# Patient Record
Sex: MALE | Race: BLACK OR AFRICAN AMERICAN | NOT HISPANIC OR LATINO | Employment: FULL TIME | URBAN - METROPOLITAN AREA
[De-identification: names, ages, dates, MRNs, and addresses within clinical notes are randomized per-mention and may not be internally consistent; named-entity substitution may affect disease eponyms.]

---

## 2023-03-24 LAB
ALANINE AMINOTRANSFERASE (SGPT) (U/L) IN SER/PLAS: 50 U/L (ref 10–52)
ALBUMIN (G/DL) IN SER/PLAS: 4.4 G/DL (ref 3.4–5)
ALKALINE PHOSPHATASE (U/L) IN SER/PLAS: 81 U/L (ref 33–120)
ANION GAP IN SER/PLAS: 15 MMOL/L (ref 10–20)
ASPARTATE AMINOTRANSFERASE (SGOT) (U/L) IN SER/PLAS: 29 U/L (ref 9–39)
BILIRUBIN TOTAL (MG/DL) IN SER/PLAS: 0.6 MG/DL (ref 0–1.2)
CALCIDIOL (25 OH VITAMIN D3) (NG/ML) IN SER/PLAS: 62 NG/ML
CALCIUM (MG/DL) IN SER/PLAS: 9.8 MG/DL (ref 8.6–10.6)
CARBON DIOXIDE, TOTAL (MMOL/L) IN SER/PLAS: 29 MMOL/L (ref 21–32)
CHLORIDE (MMOL/L) IN SER/PLAS: 95 MMOL/L (ref 98–107)
CHOLESTEROL (MG/DL) IN SER/PLAS: 155 MG/DL (ref 0–199)
CHOLESTEROL IN HDL (MG/DL) IN SER/PLAS: 62 MG/DL
CHOLESTEROL/HDL RATIO: 2.5
CREATININE (MG/DL) IN SER/PLAS: 0.85 MG/DL (ref 0.5–1.3)
ERYTHROCYTE DISTRIBUTION WIDTH (RATIO) BY AUTOMATED COUNT: 12.3 % (ref 11.5–14.5)
ERYTHROCYTE MEAN CORPUSCULAR HEMOGLOBIN CONCENTRATION (G/DL) BY AUTOMATED: 34.9 G/DL (ref 32–36)
ERYTHROCYTE MEAN CORPUSCULAR VOLUME (FL) BY AUTOMATED COUNT: 91 FL (ref 80–100)
ERYTHROCYTES (10*6/UL) IN BLOOD BY AUTOMATED COUNT: 4.71 X10E12/L (ref 4.5–5.9)
ESTIMATED AVERAGE GLUCOSE FOR HBA1C: 134 MG/DL
GFR MALE: >90 ML/MIN/1.73M2
GLUCOSE (MG/DL) IN SER/PLAS: 147 MG/DL (ref 74–99)
HEMATOCRIT (%) IN BLOOD BY AUTOMATED COUNT: 42.7 % (ref 41–52)
HEMOGLOBIN (G/DL) IN BLOOD: 14.9 G/DL (ref 13.5–17.5)
HEMOGLOBIN A1C/HEMOGLOBIN TOTAL IN BLOOD: 6.3 %
LDL: 60 MG/DL (ref 0–99)
LEUKOCYTES (10*3/UL) IN BLOOD BY AUTOMATED COUNT: 8 X10E9/L (ref 4.4–11.3)
NRBC (PER 100 WBCS) BY AUTOMATED COUNT: 0 /100 WBC (ref 0–0)
PLATELETS (10*3/UL) IN BLOOD AUTOMATED COUNT: 331 X10E9/L (ref 150–450)
POTASSIUM (MMOL/L) IN SER/PLAS: 3.7 MMOL/L (ref 3.5–5.3)
PROTEIN TOTAL: 7.5 G/DL (ref 6.4–8.2)
SODIUM (MMOL/L) IN SER/PLAS: 135 MMOL/L (ref 136–145)
THYROTROPIN (MIU/L) IN SER/PLAS BY DETECTION LIMIT <= 0.05 MIU/L: 1.17 MIU/L (ref 0.44–3.98)
TRIGLYCERIDE (MG/DL) IN SER/PLAS: 163 MG/DL (ref 0–149)
UREA NITROGEN (MG/DL) IN SER/PLAS: 15 MG/DL (ref 6–23)
VLDL: 33 MG/DL (ref 0–40)

## 2023-06-30 LAB
ESTIMATED AVERAGE GLUCOSE FOR HBA1C: 148 MG/DL
HEMOGLOBIN A1C/HEMOGLOBIN TOTAL IN BLOOD: 6.8 %

## 2023-09-05 PROBLEM — E78.00 HIGH CHOLESTEROL: Status: ACTIVE | Noted: 2023-09-05

## 2023-09-05 PROBLEM — F11.20 OPIOID DEPENDENCE, CONTINUOUS (MULTI): Status: ACTIVE | Noted: 2023-09-05

## 2023-09-05 PROBLEM — N52.9 ERECTILE DYSFUNCTION: Status: ACTIVE | Noted: 2023-09-05

## 2023-09-05 PROBLEM — N13.8 BPH WITH OBSTRUCTION/LOWER URINARY TRACT SYMPTOMS: Status: ACTIVE | Noted: 2023-09-05

## 2023-09-05 PROBLEM — F41.9 ANXIETY DISORDER: Status: ACTIVE | Noted: 2023-09-05

## 2023-09-05 PROBLEM — K85.90 PANCREATITIS (HHS-HCC): Status: ACTIVE | Noted: 2023-09-05

## 2023-09-05 PROBLEM — I10 HYPERTENSION: Status: ACTIVE | Noted: 2023-09-05

## 2023-09-05 PROBLEM — E11.9 DIABETES (MULTI): Status: ACTIVE | Noted: 2023-09-05

## 2023-09-05 PROBLEM — R73.9 HYPERGLYCEMIA: Status: ACTIVE | Noted: 2023-09-05

## 2023-09-05 PROBLEM — Z79.899 LONG-TERM CURRENT USE OF BENZODIAZEPINE: Status: ACTIVE | Noted: 2023-09-05

## 2023-09-05 PROBLEM — M87.051 AVASCULAR NECROSIS OF BONE OF RIGHT HIP (MULTI): Status: ACTIVE | Noted: 2023-09-05

## 2023-09-05 PROBLEM — R00.0 TACHYCARDIA: Status: ACTIVE | Noted: 2023-09-05

## 2023-09-05 PROBLEM — E87.1 HYPONATREMIA: Status: ACTIVE | Noted: 2023-09-05

## 2023-09-05 PROBLEM — R35.1 NOCTURIA: Status: ACTIVE | Noted: 2023-09-05

## 2023-09-05 PROBLEM — N40.1 BPH WITH OBSTRUCTION/LOWER URINARY TRACT SYMPTOMS: Status: ACTIVE | Noted: 2023-09-05

## 2023-09-05 PROBLEM — R79.89 LOW VITAMIN D LEVEL: Status: ACTIVE | Noted: 2023-09-05

## 2023-09-05 RX ORDER — TADALAFIL 5 MG/1
1 TABLET ORAL DAILY
COMMUNITY
Start: 2017-11-08 | End: 2023-12-06

## 2023-09-05 RX ORDER — LISINOPRIL 40 MG/1
1 TABLET ORAL DAILY
COMMUNITY
Start: 2014-12-27 | End: 2023-12-21 | Stop reason: SDUPTHER

## 2023-09-05 RX ORDER — NALOXONE HYDROCHLORIDE 4 MG/.1ML
SPRAY NASAL
COMMUNITY
Start: 2021-12-16

## 2023-09-05 RX ORDER — CHLORTHALIDONE 25 MG/1
1 TABLET ORAL DAILY
COMMUNITY
Start: 2015-10-07 | End: 2024-05-07

## 2023-09-05 RX ORDER — ALPRAZOLAM 0.5 MG/1
1 TABLET ORAL 2 TIMES DAILY
COMMUNITY
Start: 2013-11-22 | End: 2023-10-11 | Stop reason: SDUPTHER

## 2023-09-05 RX ORDER — ASPIRIN 81 MG/1
1 TABLET ORAL DAILY
COMMUNITY
Start: 2018-01-22

## 2023-09-05 RX ORDER — INDOMETHACIN 50 MG/1
1 CAPSULE ORAL 2 TIMES DAILY
COMMUNITY
Start: 2020-12-10

## 2023-09-05 RX ORDER — IBUPROFEN 800 MG/1
1 TABLET ORAL 3 TIMES DAILY PRN
COMMUNITY
Start: 2018-11-02

## 2023-09-05 RX ORDER — TRAMADOL HYDROCHLORIDE 50 MG/1
1 TABLET ORAL 2 TIMES DAILY PRN
COMMUNITY
Start: 2019-01-25 | End: 2024-03-21

## 2023-09-05 RX ORDER — HYDROCHLOROTHIAZIDE 25 MG/1
1 TABLET ORAL DAILY
COMMUNITY

## 2023-09-05 RX ORDER — METFORMIN HYDROCHLORIDE 500 MG/1
500 TABLET ORAL DAILY
COMMUNITY
End: 2024-01-08 | Stop reason: SDUPTHER

## 2023-09-05 RX ORDER — CHOLECALCIFEROL (VITAMIN D3) 25 MCG
1 TABLET ORAL DAILY
COMMUNITY
Start: 2021-05-19 | End: 2023-12-21 | Stop reason: SDUPTHER

## 2023-09-05 RX ORDER — CYCLOBENZAPRINE HCL 10 MG
1 TABLET ORAL NIGHTLY PRN
COMMUNITY
Start: 2021-02-19 | End: 2023-10-09

## 2023-09-05 RX ORDER — ATORVASTATIN CALCIUM 40 MG/1
1 TABLET, FILM COATED ORAL DAILY
COMMUNITY
Start: 2022-03-03 | End: 2024-02-19

## 2023-09-05 RX ORDER — OMEPRAZOLE 40 MG/1
40 CAPSULE, DELAYED RELEASE ORAL DAILY
COMMUNITY
Start: 2018-08-01 | End: 2024-01-29

## 2023-10-06 DIAGNOSIS — M87.051 AVASCULAR NECROSIS OF BONE OF RIGHT HIP (MULTI): Primary | ICD-10-CM

## 2023-10-09 RX ORDER — CYCLOBENZAPRINE HCL 10 MG
10 TABLET ORAL NIGHTLY PRN
Qty: 30 TABLET | Refills: 1 | Status: SHIPPED | OUTPATIENT
Start: 2023-10-09 | End: 2023-12-06 | Stop reason: SDUPTHER

## 2023-10-10 DIAGNOSIS — M87.051 AVASCULAR NECROSIS OF BONE OF RIGHT HIP (MULTI): ICD-10-CM

## 2023-10-11 ENCOUNTER — OFFICE VISIT (OUTPATIENT)
Dept: PRIMARY CARE | Facility: CLINIC | Age: 53
End: 2023-10-11
Payer: COMMERCIAL

## 2023-10-11 VITALS
WEIGHT: 168.8 LBS | RESPIRATION RATE: 16 BRPM | HEIGHT: 70 IN | DIASTOLIC BLOOD PRESSURE: 78 MMHG | TEMPERATURE: 96 F | OXYGEN SATURATION: 96 % | SYSTOLIC BLOOD PRESSURE: 123 MMHG | HEART RATE: 96 BPM | BODY MASS INDEX: 24.17 KG/M2

## 2023-10-11 DIAGNOSIS — I10 PRIMARY HYPERTENSION: ICD-10-CM

## 2023-10-11 DIAGNOSIS — E11.9 TYPE 2 DIABETES MELLITUS WITHOUT COMPLICATION, WITHOUT LONG-TERM CURRENT USE OF INSULIN (MULTI): Primary | ICD-10-CM

## 2023-10-11 DIAGNOSIS — F41.9 ANXIETY DISORDER, UNSPECIFIED TYPE: ICD-10-CM

## 2023-10-11 LAB
EST. AVERAGE GLUCOSE BLD GHB EST-MCNC: 128 MG/DL
HBA1C MFR BLD: 6.1 %

## 2023-10-11 PROCEDURE — 36415 COLL VENOUS BLD VENIPUNCTURE: CPT | Performed by: INTERNAL MEDICINE

## 2023-10-11 PROCEDURE — 3044F HG A1C LEVEL LT 7.0%: CPT | Performed by: INTERNAL MEDICINE

## 2023-10-11 PROCEDURE — 83036 HEMOGLOBIN GLYCOSYLATED A1C: CPT | Performed by: INTERNAL MEDICINE

## 2023-10-11 PROCEDURE — 4010F ACE/ARB THERAPY RXD/TAKEN: CPT | Performed by: INTERNAL MEDICINE

## 2023-10-11 PROCEDURE — 3074F SYST BP LT 130 MM HG: CPT | Performed by: INTERNAL MEDICINE

## 2023-10-11 PROCEDURE — 99213 OFFICE O/P EST LOW 20 MIN: CPT | Performed by: INTERNAL MEDICINE

## 2023-10-11 PROCEDURE — 3078F DIAST BP <80 MM HG: CPT | Performed by: INTERNAL MEDICINE

## 2023-10-11 RX ORDER — ALPRAZOLAM 0.5 MG/1
0.5 TABLET ORAL 2 TIMES DAILY
Qty: 60 TABLET | Refills: 2 | Status: SHIPPED | OUTPATIENT
Start: 2023-10-11 | End: 2024-01-12 | Stop reason: SDUPTHER

## 2023-10-11 RX ORDER — CYCLOBENZAPRINE HCL 10 MG
10 TABLET ORAL NIGHTLY PRN
Qty: 30 TABLET | Refills: 1 | OUTPATIENT
Start: 2023-10-11

## 2023-10-11 ASSESSMENT — ANXIETY QUESTIONNAIRES
7. FEELING AFRAID AS IF SOMETHING AWFUL MIGHT HAPPEN: NOT AT ALL
3. WORRYING TOO MUCH ABOUT DIFFERENT THINGS: NOT AT ALL
4. TROUBLE RELAXING: NOT AT ALL
6. BECOMING EASILY ANNOYED OR IRRITABLE: NOT AT ALL
IF YOU CHECKED OFF ANY PROBLEMS ON THIS QUESTIONNAIRE, HOW DIFFICULT HAVE THESE PROBLEMS MADE IT FOR YOU TO DO YOUR WORK, TAKE CARE OF THINGS AT HOME, OR GET ALONG WITH OTHER PEOPLE: NOT DIFFICULT AT ALL
1. FEELING NERVOUS, ANXIOUS, OR ON EDGE: NOT AT ALL
5. BEING SO RESTLESS THAT IT IS HARD TO SIT STILL: NOT AT ALL
2. NOT BEING ABLE TO STOP OR CONTROL WORRYING: NOT AT ALL
GAD7 TOTAL SCORE: 0

## 2023-10-11 ASSESSMENT — ENCOUNTER SYMPTOMS
ABDOMINAL PAIN: 0
FEVER: 0
VOMITING: 0
SHORTNESS OF BREATH: 0
CHILLS: 0
NAUSEA: 0

## 2023-10-11 ASSESSMENT — PAIN SCALES - GENERAL: PAINLEVEL: 0-NO PAIN

## 2023-10-11 ASSESSMENT — LIFESTYLE VARIABLES: TOTAL SCORE: 0

## 2023-10-11 NOTE — PROGRESS NOTES
"Subjective   Patient ID: Aakash Chambers is a 53 y.o. male who presents for Hyperlipidemia.    Patient presents for follow up. Anxiety and Pain meds both working although he is having some pain in his left hip for which he sees Ortho tomorrow for.  Has a history of surgery on that hip.     Hyperlipidemia  Pertinent negatives include no chest pain or shortness of breath.        Review of Systems   Constitutional:  Negative for chills and fever.   Respiratory:  Negative for shortness of breath.    Cardiovascular:  Negative for chest pain.   Gastrointestinal:  Negative for abdominal pain, nausea and vomiting.       Objective   /78 (BP Location: Left arm, Patient Position: Sitting, BP Cuff Size: Adult)   Pulse 96   Temp 35.6 °C (96 °F) (Temporal)   Resp 16   Ht 1.778 m (5' 10\")   Wt 76.6 kg (168 lb 12.8 oz)   SpO2 96%   BMI 24.22 kg/m²     Physical Exam  Gen appearance: well groomed in NAD  A & O: alert and oriented x 3  CV: RRR   Lungs: CTA bilaterally  Extr: no edema    Assessment/Plan   Anxiety: controlled on current med. States anxiety is controlled on a daily basis. I see the benefit of continuing this med at its current dose. It allows him to perform all activities of daily living.  Diabetes: will check labs today  High blood pressure: continue meds.   4.    Hip pain: will follow up with Ortho this week.  5.    Return to office in 3 months.              "

## 2023-10-12 ENCOUNTER — OFFICE VISIT (OUTPATIENT)
Dept: ORTHOPEDIC SURGERY | Facility: CLINIC | Age: 53
End: 2023-10-12
Payer: COMMERCIAL

## 2023-10-12 ENCOUNTER — ANCILLARY PROCEDURE (OUTPATIENT)
Dept: RADIOLOGY | Facility: CLINIC | Age: 53
End: 2023-10-12
Payer: COMMERCIAL

## 2023-10-12 DIAGNOSIS — M87.051 AVASCULAR NECROSIS OF BONE OF RIGHT HIP (MULTI): ICD-10-CM

## 2023-10-12 DIAGNOSIS — Z47.1 AFTERCARE FOLLOWING LEFT HIP JOINT REPLACEMENT SURGERY: Primary | ICD-10-CM

## 2023-10-12 DIAGNOSIS — Z96.642 AFTERCARE FOLLOWING LEFT HIP JOINT REPLACEMENT SURGERY: Primary | ICD-10-CM

## 2023-10-12 PROCEDURE — 4010F ACE/ARB THERAPY RXD/TAKEN: CPT | Performed by: ORTHOPAEDIC SURGERY

## 2023-10-12 PROCEDURE — 99213 OFFICE O/P EST LOW 20 MIN: CPT | Performed by: ORTHOPAEDIC SURGERY

## 2023-10-12 PROCEDURE — 3044F HG A1C LEVEL LT 7.0%: CPT | Performed by: ORTHOPAEDIC SURGERY

## 2023-10-12 PROCEDURE — 73523 X-RAY EXAM HIPS BI 5/> VIEWS: CPT | Mod: BILATERAL PROCEDURE | Performed by: RADIOLOGY

## 2023-10-12 PROCEDURE — 73523 X-RAY EXAM HIPS BI 5/> VIEWS: CPT

## 2023-10-12 PROCEDURE — 99213 OFFICE O/P EST LOW 20 MIN: CPT | Mod: 25 | Performed by: ORTHOPAEDIC SURGERY

## 2023-10-12 NOTE — PROGRESS NOTES
This 53-year-old gentleman returns today discomfort in his left hip for a couple of days that resolved.  Patient is status post left total hip replacement.  He has basically also here for a yearly check on his right avascular necrosis.  Patient is asymptomatic at present.    The patient's past medical history further complicates the situation.  He has an anxiety disorder, opioid dependence, pancreatitis, tachycardia, and diabetes.    Review of systems:  A complete review of the patient's past medical history and review of 30 systems and all medications and allergies was performed. Please see adult medical record for details.    A Family history for genetic or familial inheritance issues and Social history including smoking history, alcohol consumption and exercise activities was also reviewed and significant findings noted in the patients history of present illness.    Physical Exam:  The patient is well-nourished and well-developed and in no acute distress. The patient displayed normal mood and affect. The patient's pupils were equal, round sclerae are white. Patient's respirations appear to be regular and unlabored.    Both the left and right hips were examined.  Examination of the hips revealed no skin abnormalities. No lymphangitis. There was no tenderness to palpation of the hip, knee or thigh.  Range of motion of the hips revealed flexion to 90°, full extension, abduction 40°, adduction to 30°. Internal rotation was 20°, external rotation was 30°.  The range of motion was totally painless.    The neurovascular examination of both lower extremities was intact.The neurological exam including motor and sensory exam was performed. The vascular examination including palpation of pulses and capillary refill of the foot was performed and determined to be intact.    Imaging:  I personally reviewed and measured the radiographs including AP and lateral views of the extremity and they revealed good alignment of the left  total hip with stable interfaces and no significant wear.  The right hip shows good alignment with no significant collapse.    It is my impression the patient's doing well status post left total hip replacement and fortunately his right hip appears to be stable without collapse.    We discussed prophylaxis of both hips.  Unless patient is having symptoms he does not need another routine check for 5 years.      Note dictated with 1d4 Pty software.  Completed without full type editing and sent to avoid delay.

## 2023-12-01 ENCOUNTER — APPOINTMENT (OUTPATIENT)
Dept: PRIMARY CARE | Facility: CLINIC | Age: 53
End: 2023-12-01
Payer: COMMERCIAL

## 2023-12-06 DIAGNOSIS — N52.9 ERECTILE DYSFUNCTION, UNSPECIFIED ERECTILE DYSFUNCTION TYPE: Primary | ICD-10-CM

## 2023-12-06 RX ORDER — TADALAFIL 5 MG/1
5 TABLET ORAL DAILY
Qty: 90 TABLET | Refills: 0 | Status: SHIPPED | OUTPATIENT
Start: 2023-12-06

## 2023-12-18 ENCOUNTER — DOCUMENTATION (OUTPATIENT)
Dept: UROLOGY | Facility: CLINIC | Age: 53
End: 2023-12-18
Payer: COMMERCIAL

## 2023-12-19 ENCOUNTER — OFFICE VISIT (OUTPATIENT)
Dept: UROLOGY | Facility: CLINIC | Age: 53
End: 2023-12-19
Payer: COMMERCIAL

## 2023-12-19 VITALS — TEMPERATURE: 97.6 F | BODY MASS INDEX: 24.88 KG/M2 | WEIGHT: 173.8 LBS | HEIGHT: 70 IN

## 2023-12-19 DIAGNOSIS — Z13.6 ENCOUNTER FOR LIPID SCREENING FOR CARDIOVASCULAR DISEASE: ICD-10-CM

## 2023-12-19 DIAGNOSIS — Z00.00 HEALTH MAINTENANCE EXAMINATION: ICD-10-CM

## 2023-12-19 DIAGNOSIS — Z13.1 SCREENING FOR DIABETES MELLITUS (DM): ICD-10-CM

## 2023-12-19 DIAGNOSIS — N52.9 ERECTILE DYSFUNCTION, UNSPECIFIED ERECTILE DYSFUNCTION TYPE: Primary | ICD-10-CM

## 2023-12-19 DIAGNOSIS — Z13.220 ENCOUNTER FOR LIPID SCREENING FOR CARDIOVASCULAR DISEASE: ICD-10-CM

## 2023-12-19 PROCEDURE — 4010F ACE/ARB THERAPY RXD/TAKEN: CPT | Performed by: UROLOGY

## 2023-12-19 PROCEDURE — 99214 OFFICE O/P EST MOD 30 MIN: CPT | Performed by: UROLOGY

## 2023-12-19 PROCEDURE — 3044F HG A1C LEVEL LT 7.0%: CPT | Performed by: UROLOGY

## 2023-12-19 PROCEDURE — 1036F TOBACCO NON-USER: CPT | Performed by: UROLOGY

## 2023-12-19 NOTE — PROGRESS NOTES
HPI:  53 y.o. yo male patient complains of erectile dysfunction, referred by Adrienne. Hx of pre-diabetes.    Duration: few years  Morning Erections: not much, sometimes  s/p prostatectomy: no  On nitrates/NTG?: No  Tried PDE5is?: yes  Viagra/sildenafil - response: no  Cialis/tadalafil- response: yes, taken 5mg daily up to 20mg atleast 4x, does not help  Tried penile injections: no  Libido/Desire: Good  Have been on Testosterone /anabolic steroids? No      Lab Results   Component Value Date    PSA 0.42 03/02/2022     Lab Results   Component Value Date    HGBA1C 6.1 (H) 10/11/2023     PMH:  No past medical history on file.     PSH:  No past surgical history on file.     Medications:    Current Outpatient Medications:     ALPRAZolam (Xanax) 0.5 mg tablet, Take 1 tablet (0.5 mg) by mouth 2 times a day., Disp: 60 tablet, Rfl: 2    aspirin 81 mg EC tablet, Take 1 tablet (81 mg) by mouth once daily., Disp: , Rfl:     atorvastatin (Lipitor) 40 mg tablet, Take 1 tablet (40 mg) by mouth once daily., Disp: , Rfl:     chlorthalidone (Hygroton) 25 mg tablet, Take 1 tablet (25 mg) by mouth once daily., Disp: , Rfl:     cholecalciferol (Vitamin D-3) 25 MCG (1000 UT) tablet, Take 1 tablet (25 mcg) by mouth once daily., Disp: , Rfl:     cyclobenzaprine (Flexeril) 10 mg tablet, Take 1 tablet (10 mg) by mouth as needed at bedtime for muscle spasms., Disp: 30 tablet, Rfl: 1    hydroCHLOROthiazide (HYDRODiuril) 25 mg tablet, Take 1 tablet (25 mg) by mouth once daily., Disp: , Rfl:     ibuprofen 800 mg tablet, Take 1 tablet (800 mg) by mouth 3 times a day as needed (WITH FOOD)., Disp: , Rfl:     indomethacin (Indocin) 50 mg capsule, Take 1 capsule (50 mg) by mouth 2 times a day., Disp: , Rfl:     lisinopril 40 mg tablet, Take 1 tablet (40 mg) by mouth once daily., Disp: , Rfl:     metFORMIN (Glucophage) 500 mg tablet, Take 1 tablet (500 mg) by mouth once daily., Disp: , Rfl:     naloxone (Narcan) 4 mg/0.1 mL nasal spray, USE AS  DIRECTED, Disp: , Rfl:     omeprazole (PriLOSEC) 40 mg DR capsule, Take 1 capsule (40 mg) by mouth once daily. FOR HEART, Disp: , Rfl:     tadalafil (Cialis) 5 mg tablet, Take 1 tablet by mouth once daily, Disp: 90 tablet, Rfl: 0    traMADol (Ultram) 50 mg tablet, Take 1 tablet (50 mg) by mouth 2 times a day as needed., Disp: , Rfl:     Allergy:  No Known Allergies     Exam  Testicles descended bilaterally, nontender, no masses  Vasa palpable bilaterally  Penis circ'd, no lesions, no plaques    Assessment/Plan  #Erectile Dysfunction: I discussed the etiology and different treatment modalities for erectile dysfunction. Specifically, we talked about lifestyle factors like smoking, diet, and exercise. We also discussed ED as a sign of systemic disease, and the contributions of elevated cholesterol and elevated blood sugars on erections. We then discussed treatment modalities, specifically PDE5 inhibitors, intracavernosal injections, vacuum erectile devices, and penile prostheses.    ED panel  Continue daily Cialis 5mg.  Will schedule intracavernosal injection and penile duplex ultrasound. Discussed that this involves an injection in the penis and subsequent ultrasound of penis to measure vasculature. This may result in a prolonged erection, which may require injection of reversal agent prior to discharge. Risks of priapism, pain, scar tissue, bruising, discussed. All questions answered.    Johnny for doppler    Scribe Attestation  By signing my name below, I, Glory Sanabria , Scribe   attest that this documentation has been prepared under the direction and in the presence of Westley Reese MD.

## 2023-12-20 DIAGNOSIS — I10 PRIMARY HYPERTENSION: ICD-10-CM

## 2023-12-20 DIAGNOSIS — R79.89 LOW VITAMIN D LEVEL: Primary | ICD-10-CM

## 2023-12-21 RX ORDER — LISINOPRIL 40 MG/1
40 TABLET ORAL DAILY
Qty: 90 TABLET | Refills: 1 | Status: SHIPPED | OUTPATIENT
Start: 2023-12-21

## 2023-12-21 RX ORDER — CHOLECALCIFEROL (VITAMIN D3) 25 MCG
TABLET ORAL DAILY
Qty: 90 TABLET | Refills: 1 | Status: SHIPPED | OUTPATIENT
Start: 2023-12-21

## 2023-12-29 PROBLEM — M25.374 INSTABILITY OF RIGHT FOOT JOINT: Status: ACTIVE | Noted: 2020-08-31

## 2023-12-29 PROBLEM — M79.671 BILATERAL FOOT PAIN: Status: ACTIVE | Noted: 2020-08-31

## 2023-12-29 PROBLEM — M20.12 HALLUX ABDUCTO VALGUS, BILATERAL: Status: ACTIVE | Noted: 2020-08-31

## 2023-12-29 PROBLEM — M35.7 FOOT JOINT HYPERMOBILITY: Status: ACTIVE | Noted: 2020-08-31

## 2023-12-29 PROBLEM — M79.672 BILATERAL FOOT PAIN: Status: ACTIVE | Noted: 2020-08-31

## 2023-12-29 PROBLEM — M25.375 INSTABILITY OF LEFT FOOT JOINT: Status: ACTIVE | Noted: 2020-08-31

## 2023-12-29 PROBLEM — D23.71: Status: ACTIVE | Noted: 2020-08-31

## 2023-12-29 PROBLEM — M20.31: Status: ACTIVE | Noted: 2020-09-14

## 2023-12-29 PROBLEM — D23.72 BENIGN NEOPLASM OF SKIN OF LOWER LIMB, INCLUDING HIP, LEFT: Status: ACTIVE | Noted: 2020-08-31

## 2023-12-29 PROBLEM — M20.11 HALLUX ABDUCTO VALGUS, BILATERAL: Status: ACTIVE | Noted: 2020-08-31

## 2023-12-29 PROBLEM — M20.32: Status: ACTIVE | Noted: 2020-09-14

## 2023-12-29 RX ORDER — POTASSIUM CHLORIDE 750 MG/1
10 TABLET, FILM COATED, EXTENDED RELEASE ORAL
COMMUNITY
Start: 2016-11-02

## 2023-12-29 RX ORDER — MULTIVITAMIN
1 TABLET ORAL
COMMUNITY

## 2023-12-29 RX ORDER — ACETAMINOPHEN 500 MG
1000 TABLET ORAL 3 TIMES DAILY
COMMUNITY
Start: 2016-09-15

## 2023-12-29 RX ORDER — LIDOCAINE 50 MG/G
1 PATCH TOPICAL EVERY 24 HOURS
COMMUNITY
Start: 2023-06-20

## 2023-12-29 RX ORDER — OXYCODONE HYDROCHLORIDE 5 MG/1
5 TABLET ORAL EVERY 6 HOURS PRN
COMMUNITY
Start: 2016-10-27 | End: 2024-01-12 | Stop reason: ALTCHOICE

## 2023-12-29 RX ORDER — NAPROXEN 500 MG/1
500 TABLET ORAL 2 TIMES DAILY PRN
COMMUNITY

## 2023-12-29 RX ORDER — MELOXICAM 15 MG/1
15 TABLET ORAL
COMMUNITY

## 2024-01-08 DIAGNOSIS — E11.9 TYPE 2 DIABETES MELLITUS WITHOUT COMPLICATION, WITHOUT LONG-TERM CURRENT USE OF INSULIN (MULTI): Primary | ICD-10-CM

## 2024-01-08 RX ORDER — METFORMIN HYDROCHLORIDE 500 MG/1
500 TABLET ORAL DAILY
Qty: 90 TABLET | Refills: 1 | Status: SHIPPED | OUTPATIENT
Start: 2024-01-08

## 2024-01-09 ENCOUNTER — APPOINTMENT (OUTPATIENT)
Dept: UROLOGY | Facility: CLINIC | Age: 54
End: 2024-01-09
Payer: COMMERCIAL

## 2024-01-12 ENCOUNTER — OFFICE VISIT (OUTPATIENT)
Dept: PRIMARY CARE | Facility: CLINIC | Age: 54
End: 2024-01-12
Payer: COMMERCIAL

## 2024-01-12 VITALS
RESPIRATION RATE: 16 BRPM | OXYGEN SATURATION: 98 % | DIASTOLIC BLOOD PRESSURE: 85 MMHG | TEMPERATURE: 97.2 F | SYSTOLIC BLOOD PRESSURE: 143 MMHG | BODY MASS INDEX: 25.77 KG/M2 | WEIGHT: 180 LBS | HEIGHT: 70 IN | HEART RATE: 93 BPM

## 2024-01-12 DIAGNOSIS — N52.9 ERECTILE DYSFUNCTION, UNSPECIFIED ERECTILE DYSFUNCTION TYPE: ICD-10-CM

## 2024-01-12 DIAGNOSIS — Z79.899 LONG-TERM CURRENT USE OF BENZODIAZEPINE: Primary | ICD-10-CM

## 2024-01-12 DIAGNOSIS — I10 ESSENTIAL HYPERTENSION: ICD-10-CM

## 2024-01-12 DIAGNOSIS — M87.059 AVASCULAR NECROSIS OF BONE OF HIP, UNSPECIFIED LATERALITY (MULTI): ICD-10-CM

## 2024-01-12 DIAGNOSIS — F41.9 ANXIETY DISORDER, UNSPECIFIED TYPE: ICD-10-CM

## 2024-01-12 DIAGNOSIS — E11.69 TYPE 2 DIABETES MELLITUS WITH OTHER SPECIFIED COMPLICATION, WITHOUT LONG-TERM CURRENT USE OF INSULIN (MULTI): ICD-10-CM

## 2024-01-12 LAB
AMPHETAMINES UR QL SCN: NORMAL
ANION GAP SERPL CALC-SCNC: 16 MMOL/L (ref 10–20)
BARBITURATES UR QL SCN: NORMAL
BUN SERPL-MCNC: 11 MG/DL (ref 6–23)
BZE UR QL SCN: NORMAL
CALCIUM SERPL-MCNC: 9.8 MG/DL (ref 8.6–10.6)
CANNABINOIDS UR QL SCN: NORMAL
CHLORIDE SERPL-SCNC: 91 MMOL/L (ref 98–107)
CHOLEST SERPL-MCNC: 158 MG/DL (ref 0–199)
CHOLESTEROL/HDL RATIO: 2.4
CO2 SERPL-SCNC: 27 MMOL/L (ref 21–32)
CREAT SERPL-MCNC: 0.77 MG/DL (ref 0.5–1.3)
CREAT UR-MCNC: 37.1 MG/DL (ref 20–370)
EGFRCR SERPLBLD CKD-EPI 2021: >90 ML/MIN/1.73M*2
ERYTHROCYTE [DISTWIDTH] IN BLOOD BY AUTOMATED COUNT: 12.8 % (ref 11.5–14.5)
EST. AVERAGE GLUCOSE BLD GHB EST-MCNC: 140 MG/DL
FSH SERPL-ACNC: 2.5 IU/L
GLUCOSE SERPL-MCNC: 108 MG/DL (ref 74–99)
HBA1C MFR BLD: 6.5 %
HCT VFR BLD AUTO: 46 % (ref 41–52)
HDLC SERPL-MCNC: 65.7 MG/DL
HGB BLD-MCNC: 16.5 G/DL (ref 13.5–17.5)
LDLC SERPL CALC-MCNC: 59 MG/DL
LH SERPL-ACNC: 5.5 IU/L
MCH RBC QN AUTO: 32.4 PG (ref 26–34)
MCHC RBC AUTO-ENTMCNC: 35.9 G/DL (ref 32–36)
MCV RBC AUTO: 90 FL (ref 80–100)
NON HDL CHOLESTEROL: 92 MG/DL (ref 0–149)
NRBC BLD-RTO: 0 /100 WBCS (ref 0–0)
PCP UR QL SCN: NORMAL
PLATELET # BLD AUTO: 284 X10*3/UL (ref 150–450)
POTASSIUM SERPL-SCNC: 3.9 MMOL/L (ref 3.5–5.3)
PROLACTIN SERPL-MCNC: 5.4 UG/L (ref 2–18)
RBC # BLD AUTO: 5.1 X10*6/UL (ref 4.5–5.9)
SODIUM SERPL-SCNC: 130 MMOL/L (ref 136–145)
TRIGL SERPL-MCNC: 166 MG/DL (ref 0–149)
VLDL: 33 MG/DL (ref 0–40)
WBC # BLD AUTO: 6.7 X10*3/UL (ref 4.4–11.3)

## 2024-01-12 PROCEDURE — 3077F SYST BP >= 140 MM HG: CPT | Performed by: INTERNAL MEDICINE

## 2024-01-12 PROCEDURE — 99213 OFFICE O/P EST LOW 20 MIN: CPT | Performed by: INTERNAL MEDICINE

## 2024-01-12 PROCEDURE — 4010F ACE/ARB THERAPY RXD/TAKEN: CPT | Performed by: INTERNAL MEDICINE

## 2024-01-12 PROCEDURE — 83002 ASSAY OF GONADOTROPIN (LH): CPT | Performed by: INTERNAL MEDICINE

## 2024-01-12 PROCEDURE — 84403 ASSAY OF TOTAL TESTOSTERONE: CPT | Performed by: INTERNAL MEDICINE

## 2024-01-12 PROCEDURE — 85027 COMPLETE CBC AUTOMATED: CPT | Performed by: INTERNAL MEDICINE

## 2024-01-12 PROCEDURE — 80061 LIPID PANEL: CPT | Performed by: INTERNAL MEDICINE

## 2024-01-12 PROCEDURE — 3079F DIAST BP 80-89 MM HG: CPT | Performed by: INTERNAL MEDICINE

## 2024-01-12 PROCEDURE — 83036 HEMOGLOBIN GLYCOSYLATED A1C: CPT | Performed by: INTERNAL MEDICINE

## 2024-01-12 PROCEDURE — 36415 COLL VENOUS BLD VENIPUNCTURE: CPT | Performed by: INTERNAL MEDICINE

## 2024-01-12 PROCEDURE — 83001 ASSAY OF GONADOTROPIN (FSH): CPT | Performed by: INTERNAL MEDICINE

## 2024-01-12 PROCEDURE — 80048 BASIC METABOLIC PNL TOTAL CA: CPT | Performed by: INTERNAL MEDICINE

## 2024-01-12 PROCEDURE — 82570 ASSAY OF URINE CREATININE: CPT | Mod: 59 | Performed by: INTERNAL MEDICINE

## 2024-01-12 PROCEDURE — 1036F TOBACCO NON-USER: CPT | Performed by: INTERNAL MEDICINE

## 2024-01-12 PROCEDURE — 84146 ASSAY OF PROLACTIN: CPT | Performed by: INTERNAL MEDICINE

## 2024-01-12 PROCEDURE — 80346 BENZODIAZEPINES1-12: CPT | Performed by: INTERNAL MEDICINE

## 2024-01-12 PROCEDURE — 84402 ASSAY OF FREE TESTOSTERONE: CPT | Performed by: INTERNAL MEDICINE

## 2024-01-12 RX ORDER — ALPRAZOLAM 0.5 MG/1
0.5 TABLET ORAL 2 TIMES DAILY
Qty: 60 TABLET | Refills: 2 | Status: SHIPPED | OUTPATIENT
Start: 2024-01-12 | End: 2024-01-17

## 2024-01-12 ASSESSMENT — PAIN SCALES - GENERAL: PAINLEVEL: 0-NO PAIN

## 2024-01-14 ENCOUNTER — TELEPHONE (OUTPATIENT)
Dept: PRIMARY CARE | Facility: CLINIC | Age: 54
End: 2024-01-14
Payer: COMMERCIAL

## 2024-01-14 DIAGNOSIS — N52.9 ERECTILE DYSFUNCTION, UNSPECIFIED ERECTILE DYSFUNCTION TYPE: Primary | ICD-10-CM

## 2024-01-14 NOTE — TELEPHONE ENCOUNTER
Call patient to drop by  office to have his testosterone redrawn.  It was collected in the wrong tube. Make sure we have right tube this time.

## 2024-01-15 DIAGNOSIS — F41.9 ANXIETY DISORDER, UNSPECIFIED TYPE: ICD-10-CM

## 2024-01-16 ENCOUNTER — PROCEDURE VISIT (OUTPATIENT)
Dept: UROLOGY | Facility: CLINIC | Age: 54
End: 2024-01-16
Payer: COMMERCIAL

## 2024-01-16 VITALS
DIASTOLIC BLOOD PRESSURE: 85 MMHG | HEIGHT: 70 IN | BODY MASS INDEX: 25.83 KG/M2 | SYSTOLIC BLOOD PRESSURE: 135 MMHG | TEMPERATURE: 97.9 F | HEART RATE: 108 BPM

## 2024-01-16 DIAGNOSIS — R73.9 ELEVATED BLOOD SUGAR LEVEL: ICD-10-CM

## 2024-01-16 DIAGNOSIS — E78.89 LIPIDS ABNORMAL: ICD-10-CM

## 2024-01-16 DIAGNOSIS — Z09 ENCOUNTER FOR FOLLOW-UP: Primary | ICD-10-CM

## 2024-01-16 DIAGNOSIS — N52.9 ERECTILE DYSFUNCTION, UNSPECIFIED ERECTILE DYSFUNCTION TYPE: ICD-10-CM

## 2024-01-16 DIAGNOSIS — Z71.2 ENCOUNTER TO DISCUSS TEST RESULTS: ICD-10-CM

## 2024-01-16 DIAGNOSIS — N48.30 PRIAPISM: ICD-10-CM

## 2024-01-16 LAB
1OH-MIDAZOLAM UR CFM-MCNC: <25 NG/ML
6MAM UR CFM-MCNC: <25 NG/ML
7AMINOCLONAZEPAM UR CFM-MCNC: <25 NG/ML
A-OH ALPRAZ UR CFM-MCNC: 52 NG/ML
ALPRAZ UR CFM-MCNC: 46 NG/ML
CHLORDIAZEP UR CFM-MCNC: <25 NG/ML
CLONAZEPAM UR CFM-MCNC: <25 NG/ML
CODEINE UR CFM-MCNC: <50 NG/ML
DIAZEPAM UR CFM-MCNC: <25 NG/ML
EDDP UR CFM-MCNC: <25 NG/ML
FENTANYL UR CFM-MCNC: <2.5 NG/ML
HYDROCODONE CTO UR CFM-MCNC: <25 NG/ML
HYDROMORPHONE UR CFM-MCNC: <25 NG/ML
LORAZEPAM UR CFM-MCNC: <25 NG/ML
METHADONE UR CFM-MCNC: <25 NG/ML
MIDAZOLAM UR CFM-MCNC: <25 NG/ML
MORPHINE UR CFM-MCNC: <50 NG/ML
NORDIAZEPAM UR CFM-MCNC: <25 NG/ML
NORFENTANYL UR CFM-MCNC: <2.5 NG/ML
NORHYDROCODONE UR CFM-MCNC: <25 NG/ML
NOROXYCODONE UR CFM-MCNC: <25 NG/ML
NORTRAMADOL UR-MCNC: >1000 NG/ML
OXAZEPAM UR CFM-MCNC: <25 NG/ML
OXYCODONE UR CFM-MCNC: <25 NG/ML
OXYMORPHONE UR CFM-MCNC: <25 NG/ML
TEMAZEPAM UR CFM-MCNC: <25 NG/ML
TESTOST SERPL-MCNC: 641 NG/DL (ref 240–1000)
TRAMADOL UR CFM-MCNC: >1000 NG/ML
ZOLPIDEM UR CFM-MCNC: <25 NG/ML
ZOLPIDEM UR-MCNC: <25 NG/ML

## 2024-01-16 PROCEDURE — 93980 PENILE VASCULAR STUDY: CPT | Performed by: UROLOGY

## 2024-01-16 PROCEDURE — 99214 OFFICE O/P EST MOD 30 MIN: CPT | Performed by: UROLOGY

## 2024-01-16 PROCEDURE — 54235 NJX CORPORA CAVERNOSA RX AGT: CPT | Performed by: UROLOGY

## 2024-01-16 ASSESSMENT — PAIN SCALES - GENERAL: PAINLEVEL: 0-NO PAIN

## 2024-01-16 NOTE — PROGRESS NOTES
HPI  53 y.o. male patient complains of erectile dysfunction, referred by Adrienne. Hx of pre-diabetes.    Last Visit 12/19/23:  -ED panel  -Continue daily cialis 5mg  -PDU counseling     Todays Visit:  #Erectile Dysfunction   -had doppler today / 20 of 5 (see separate note)  -ED panel incomplete, has not gotten testosterone    Duration: few years  Morning Erections: not much, sometimes  s/p prostatectomy: no  On nitrates/NTG?: No  Tried PDE5is?: yes  Viagra/sildenafil - response: no  Cialis/tadalafil- response: yes, taken 5mg daily up to 20mg atleast 4x, does not help  Tried penile injections: no  Libido/Desire: Good  Have been on Testosterone /anabolic steroids? No      Lab Results   Component Value Date    LH 5.5 01/12/2024     Lab Results   Component Value Date    FSH 2.5 01/12/2024     Lab Results   Component Value Date    PROLACTIN 5.4 01/12/2024     Lab Results   Component Value Date    CREATININE 0.77 01/12/2024     Lab Results   Component Value Date    CHOL 158 01/12/2024     Lab Results   Component Value Date    HDL 65.7 01/12/2024     Lab Results   Component Value Date    CHHDL 2.4 01/12/2024     Lab Results   Component Value Date    VLDL 33 01/12/2024     Lab Results   Component Value Date    TRIG 166 (H) 01/12/2024     Lab Results   Component Value Date    HGBA1C 6.5 (H) 01/12/2024     Lab Results   Component Value Date    HCT 46.0 01/12/2024       Current Medications:  Current Outpatient Medications   Medication Sig Dispense Refill    acetaminophen (Tylenol) 500 mg tablet Take 2 tablets (1,000 mg) by mouth 3 times a day.      ALPRAZolam (Xanax) 0.5 mg tablet Take 1 tablet (0.5 mg) by mouth 2 times a day. 60 tablet 2    aspirin 81 mg EC tablet Take 1 tablet (81 mg) by mouth once daily.      atorvastatin (Lipitor) 40 mg tablet Take 1 tablet (40 mg) by mouth once daily.      chlorthalidone (Hygroton) 25 mg tablet Take 1 tablet (25 mg) by mouth once daily.      cholecalciferol (Vitamin D-3) 25 MCG (1000 UT)  tablet Take 1 tablet by mouth once daily 90 tablet 1    cyclobenzaprine (Flexeril) 10 mg tablet Take 1 tablet (10 mg) by mouth as needed at bedtime for muscle spasms. 30 tablet 1    hydroCHLOROthiazide (HYDRODiuril) 25 mg tablet Take 1 tablet (25 mg) by mouth once daily.      ibuprofen 800 mg tablet Take 1 tablet (800 mg) by mouth 3 times a day as needed (WITH FOOD).      indomethacin (Indocin) 50 mg capsule Take 1 capsule (50 mg) by mouth 2 times a day.      lidocaine (Lidoderm) 5 % patch Place 1 patch on the skin once every 24 hours.      lisinopril 40 mg tablet Take 1 tablet by mouth once daily 90 tablet 1    meloxicam (Mobic) 15 mg tablet Take 1 tablet (15 mg) by mouth once daily.      metFORMIN (Glucophage) 500 mg tablet Take 1 tablet by mouth once daily 90 tablet 1    multivitamin tablet Take 1 tablet by mouth once daily.      naloxone (Narcan) 4 mg/0.1 mL nasal spray USE AS DIRECTED      naproxen (Naprosyn) 500 mg tablet Take 1 tablet (500 mg) by mouth 2 times a day as needed.      omeprazole (PriLOSEC) 40 mg DR capsule Take 1 capsule (40 mg) by mouth once daily. FOR HEART      potassium chloride CR 10 mEq ER tablet Take 1 tablet (10 mEq) by mouth once daily.      raNITIdine in sodium chloride 0.9% solution Take 30 mL (150 mg) by mouth.      tadalafil (Cialis) 5 mg tablet Take 1 tablet by mouth once daily 90 tablet 0    traMADol (Ultram) 50 mg tablet Take 1 tablet (50 mg) by mouth 2 times a day as needed.       No current facility-administered medications for this visit.        PMH:  No past medical history on file.    PSH:  No past surgical history on file.    FMH:  Family History   Problem Relation Name Age of Onset    Hypertension Mother      Cancer Mother      Lung cancer Father      Cancer Sister      Asthma Brother      Hypertension Brother      Diabetes Mother's Brother      Breast cancer Father's Sister         SHx:  Social History     Tobacco Use    Smoking status: Never    Smokeless tobacco: Never    Substance Use Topics    Alcohol use: Never    Drug use: Never       Allergies:  No Known Allergies    Physical Exam   Testicles descended bilaterally, nontender, no masses  Vasa palpable bilaterally  Penis circ'd, no lesions, no plaques    Assessment/Plan  #Severe erectile dysfunction: secondary to arterial insufficiency and corporo veno-occlusive dysfunction refractory to medical management with PDE's (Viagra, Cialis) and intracavernosal therapy      -We had a long discussion regarding penile prosthesis, including risks, benefits, and alternatives. We discussed risks including but not limited to pain, bleeding, infection, damage to adjacent structures, need for further procedures, malfunction, erosion, extrusion, complications of anesthesia etc. We discussed the postoperative course and expectations, and specifically that after getting an implant, other methods such as injections etc are no longer effective. We also discussed the effect of the implant on length and that it will likely be shorter than previous given age and duration of ED. Further, if he gets an infection and the implant has to be removed, there is potential for him to never have erections again. All questions were answered and reading materials were given. The patient was given models of both the inflatable and malleable implants, and his ability to squeeze the pump was also assessed   -testosterone (pending)    -will start ICI with mix 5 at home    #priapism  -warning signs reviewed in detail  -injected with 500mcg of phenylephrine       Scribe Attestation  By signing my name below, IGlory-Corey, Scribdoris, attest that this documentation  has been prepared under the direction and in the presence of Westley Reese MD.

## 2024-01-16 NOTE — PROGRESS NOTES
Patient ID: Aakash Chambers is a 53 y.o. male.    Procedures  Procedure:  Intracavernosal injection   Penile ultrasound/gray scale   Penile duplex Doppler ultrasound     Indication: Erectile dysfunction refractory to PDE5 inhibitors  Penile Ultrasound: Morphologic and gray scale evaluation of the penis     1.- Tunical integrity:    Normal: smooth, continuous with thickness < 2 mm     2.- Vascular integrity:     Grade 1: continuous and smooth vessel wall     3.- Erectile tissue integrity:   Diffuse Calcifications     Medication:     20 units of mixture # 5      Penile rigidity: 50%   Penile curvature: none   Phenylephrine: 500mcg     Penile duplex Doppler ultrasound:             Right   Left   Cavernosal artery diameters:          0.49 mm       0.62 mm   Peak systolic velocities:  28.5 cm/sec           23.75 cm/sec    End diastolic velocities:  13.5 cm/sec             8.91 cm/sec       Impression: Severe erectile dysfunction secondary to arterial insufficiency and corporo veno-occlusive dysfunction refractory to medical management with PDE's (Viagra, Cialis) and intracavernosal therapy     Intracavernosal therapy. The patient was instructed how to self inject inject intracavernosal therapy and instructed to start injecting 10 units of mixture  5. He may titrate the dose up or down by 5 units to maximize penile rigidity and duration of erection.      The patient was also counseled extensively that today or while on injections If he develops a priapism / erection over 4 hours he was ask to return to the office or to the emergency room immediately. Educational material was provided and all his questions and concerns were addressed.       Scribe Attestation  By signing my name below, IGlory, Yudy, attest that this documentation  has been prepared under the direction and in the presence of Westley Reese MD.

## 2024-01-17 RX ORDER — ALPRAZOLAM 0.5 MG/1
0.5 TABLET ORAL 2 TIMES DAILY
Qty: 60 TABLET | Refills: 0 | Status: SHIPPED | OUTPATIENT
Start: 2024-01-17 | End: 2024-02-19

## 2024-01-24 ENCOUNTER — APPOINTMENT (OUTPATIENT)
Dept: PRIMARY CARE | Facility: CLINIC | Age: 54
End: 2024-01-24
Payer: COMMERCIAL

## 2024-01-27 DIAGNOSIS — K21.9 GASTROESOPHAGEAL REFLUX DISEASE WITHOUT ESOPHAGITIS: Primary | ICD-10-CM

## 2024-01-29 RX ORDER — OMEPRAZOLE 40 MG/1
CAPSULE, DELAYED RELEASE ORAL
Qty: 90 CAPSULE | Refills: 1 | Status: SHIPPED | OUTPATIENT
Start: 2024-01-29

## 2024-02-15 ENCOUNTER — APPOINTMENT (OUTPATIENT)
Dept: PRIMARY CARE | Facility: CLINIC | Age: 54
End: 2024-02-15
Payer: COMMERCIAL

## 2024-02-15 DIAGNOSIS — M87.051 AVASCULAR NECROSIS OF BONE OF RIGHT HIP (MULTI): ICD-10-CM

## 2024-02-15 RX ORDER — CYCLOBENZAPRINE HCL 10 MG
10 TABLET ORAL NIGHTLY PRN
Qty: 30 TABLET | Refills: 5 | Status: SHIPPED | OUTPATIENT
Start: 2024-02-15 | End: 2024-02-15 | Stop reason: SDUPTHER

## 2024-02-15 RX ORDER — CYCLOBENZAPRINE HCL 10 MG
10 TABLET ORAL NIGHTLY PRN
Qty: 30 TABLET | Refills: 5 | Status: SHIPPED | OUTPATIENT
Start: 2024-02-15

## 2024-02-18 DIAGNOSIS — F41.9 ANXIETY DISORDER, UNSPECIFIED TYPE: ICD-10-CM

## 2024-02-19 RX ORDER — ATORVASTATIN CALCIUM 40 MG/1
40 TABLET, FILM COATED ORAL DAILY
Qty: 30 TABLET | Refills: 0 | Status: SHIPPED | OUTPATIENT
Start: 2024-02-19 | End: 2024-03-15 | Stop reason: SDUPTHER

## 2024-02-19 RX ORDER — ALPRAZOLAM 0.5 MG/1
0.5 TABLET ORAL 2 TIMES DAILY
Qty: 60 TABLET | Refills: 2 | Status: SHIPPED | OUTPATIENT
Start: 2024-02-19 | End: 2024-05-20

## 2024-03-09 DIAGNOSIS — M87.051 AVASCULAR NECROSIS OF BONE OF RIGHT HIP (MULTI): Primary | ICD-10-CM

## 2024-03-15 DIAGNOSIS — F41.9 ANXIETY DISORDER, UNSPECIFIED TYPE: ICD-10-CM

## 2024-03-15 RX ORDER — ATORVASTATIN CALCIUM 40 MG/1
40 TABLET, FILM COATED ORAL DAILY
Qty: 90 TABLET | Refills: 1 | Status: SHIPPED | OUTPATIENT
Start: 2024-03-15

## 2024-03-21 RX ORDER — TRAMADOL HYDROCHLORIDE 50 MG/1
50 TABLET ORAL 2 TIMES DAILY PRN
Qty: 60 TABLET | Refills: 0 | Status: SHIPPED | OUTPATIENT
Start: 2024-03-21 | End: 2024-05-07

## 2024-04-17 ENCOUNTER — OFFICE VISIT (OUTPATIENT)
Dept: PRIMARY CARE | Facility: CLINIC | Age: 54
End: 2024-04-17
Payer: COMMERCIAL

## 2024-04-17 VITALS
WEIGHT: 176.5 LBS | BODY MASS INDEX: 25.27 KG/M2 | SYSTOLIC BLOOD PRESSURE: 130 MMHG | HEART RATE: 77 BPM | DIASTOLIC BLOOD PRESSURE: 88 MMHG | HEIGHT: 70 IN

## 2024-04-17 DIAGNOSIS — N52.9 ERECTILE DYSFUNCTION, UNSPECIFIED ERECTILE DYSFUNCTION TYPE: ICD-10-CM

## 2024-04-17 DIAGNOSIS — F41.9 ANXIETY DISORDER, UNSPECIFIED TYPE: ICD-10-CM

## 2024-04-17 DIAGNOSIS — E11.69 TYPE 2 DIABETES MELLITUS WITH OTHER SPECIFIED COMPLICATION, WITHOUT LONG-TERM CURRENT USE OF INSULIN (MULTI): ICD-10-CM

## 2024-04-17 DIAGNOSIS — F11.20 OPIOID DEPENDENCE, CONTINUOUS (MULTI): ICD-10-CM

## 2024-04-17 DIAGNOSIS — Z79.899 LONG-TERM CURRENT USE OF BENZODIAZEPINE: ICD-10-CM

## 2024-04-17 DIAGNOSIS — I10 ESSENTIAL HYPERTENSION: Primary | ICD-10-CM

## 2024-04-17 PROBLEM — D23.72 BENIGN NEOPLASM OF SKIN OF LOWER LIMB, INCLUDING HIP, LEFT: Status: RESOLVED | Noted: 2020-08-31 | Resolved: 2024-04-17

## 2024-04-17 PROCEDURE — 3079F DIAST BP 80-89 MM HG: CPT | Performed by: INTERNAL MEDICINE

## 2024-04-17 PROCEDURE — 3048F LDL-C <100 MG/DL: CPT | Performed by: INTERNAL MEDICINE

## 2024-04-17 PROCEDURE — 3060F POS MICROALBUMINURIA REV: CPT | Performed by: INTERNAL MEDICINE

## 2024-04-17 PROCEDURE — 99213 OFFICE O/P EST LOW 20 MIN: CPT | Performed by: INTERNAL MEDICINE

## 2024-04-17 PROCEDURE — 1036F TOBACCO NON-USER: CPT | Performed by: INTERNAL MEDICINE

## 2024-04-17 PROCEDURE — 3044F HG A1C LEVEL LT 7.0%: CPT | Performed by: INTERNAL MEDICINE

## 2024-04-17 PROCEDURE — 4010F ACE/ARB THERAPY RXD/TAKEN: CPT | Performed by: INTERNAL MEDICINE

## 2024-04-17 PROCEDURE — 3075F SYST BP GE 130 - 139MM HG: CPT | Performed by: INTERNAL MEDICINE

## 2024-04-17 ASSESSMENT — PATIENT HEALTH QUESTIONNAIRE - PHQ9
SUM OF ALL RESPONSES TO PHQ9 QUESTIONS 1 AND 2: 0
1. LITTLE INTEREST OR PLEASURE IN DOING THINGS: NOT AT ALL
2. FEELING DOWN, DEPRESSED OR HOPELESS: NOT AT ALL

## 2024-04-17 ASSESSMENT — ENCOUNTER SYMPTOMS
LOSS OF SENSATION IN FEET: 0
OCCASIONAL FEELINGS OF UNSTEADINESS: 0
CHILLS: 0
FEVER: 0
SHORTNESS OF BREATH: 0
ABDOMINAL PAIN: 0
NAUSEA: 0
DEPRESSION: 0
VOMITING: 0

## 2024-04-17 ASSESSMENT — PAIN SCALES - GENERAL: PAINLEVEL: 0-NO PAIN

## 2024-04-17 NOTE — PROGRESS NOTES
"Subjective   Patient ID: Aakash Chambers is a 53 y.o. male who presents for Follow-up.    Patient presents for follow up. No acute issues. Has seen  for ED.          Review of Systems   Constitutional:  Negative for chills and fever.   Respiratory:  Negative for shortness of breath.    Cardiovascular:  Negative for chest pain.   Gastrointestinal:  Negative for abdominal pain, nausea and vomiting.       Objective   BP (!) 141/91   Pulse 94   Ht 1.778 m (5' 10\")   Wt 80.1 kg (176 lb 8 oz)   BMI 25.33 kg/m²     Physical Exam  Gen appearance: well groomed in NAD  A & O: alert and oriented x 3  CV: RRR   Lungs: CTA bilaterally  Extr: no edema   Assessment/Plan   HTN: cont med  Anxiety: controlled on current dose of med. No daily anxiety. No signs of abuse.   DM: cont med  ED: f/up with   RTO 3 mos  CPE will be in OCT.        "

## 2024-05-04 DIAGNOSIS — I10 ESSENTIAL HYPERTENSION: Primary | ICD-10-CM

## 2024-05-05 DIAGNOSIS — M87.051 AVASCULAR NECROSIS OF BONE OF RIGHT HIP (MULTI): ICD-10-CM

## 2024-05-07 RX ORDER — TRAMADOL HYDROCHLORIDE 50 MG/1
50 TABLET ORAL 2 TIMES DAILY PRN
Qty: 60 TABLET | Refills: 2 | Status: SHIPPED | OUTPATIENT
Start: 2024-05-07

## 2024-05-07 RX ORDER — CHLORTHALIDONE 25 MG/1
25 TABLET ORAL DAILY
Qty: 90 TABLET | Refills: 1 | Status: SHIPPED | OUTPATIENT
Start: 2024-05-07

## 2024-05-18 DIAGNOSIS — F41.9 ANXIETY DISORDER, UNSPECIFIED TYPE: ICD-10-CM

## 2024-05-20 RX ORDER — ALPRAZOLAM 0.5 MG/1
0.5 TABLET ORAL 2 TIMES DAILY
Qty: 60 TABLET | Refills: 2 | Status: SHIPPED | OUTPATIENT
Start: 2024-05-20

## 2024-06-12 DIAGNOSIS — I10 PRIMARY HYPERTENSION: ICD-10-CM

## 2024-06-12 DIAGNOSIS — R79.89 LOW VITAMIN D LEVEL: ICD-10-CM

## 2024-06-12 RX ORDER — LISINOPRIL 40 MG/1
40 TABLET ORAL DAILY
Qty: 90 TABLET | Refills: 0 | Status: SHIPPED | OUTPATIENT
Start: 2024-06-12

## 2024-06-12 RX ORDER — CHOLECALCIFEROL (VITAMIN D3) 25 MCG
1000 TABLET ORAL DAILY
Qty: 90 TABLET | Refills: 3 | Status: SHIPPED | OUTPATIENT
Start: 2024-06-12 | End: 2025-06-12

## 2024-07-03 DIAGNOSIS — E11.9 TYPE 2 DIABETES MELLITUS WITHOUT COMPLICATION, WITHOUT LONG-TERM CURRENT USE OF INSULIN (MULTI): ICD-10-CM

## 2024-07-03 RX ORDER — METFORMIN HYDROCHLORIDE 500 MG/1
500 TABLET ORAL DAILY
Qty: 90 TABLET | Refills: 1 | Status: SHIPPED | OUTPATIENT
Start: 2024-07-03

## 2024-07-19 ENCOUNTER — OFFICE VISIT (OUTPATIENT)
Dept: PRIMARY CARE | Facility: CLINIC | Age: 54
End: 2024-07-19
Payer: COMMERCIAL

## 2024-07-19 VITALS
WEIGHT: 162 LBS | DIASTOLIC BLOOD PRESSURE: 90 MMHG | HEIGHT: 70 IN | OXYGEN SATURATION: 99 % | SYSTOLIC BLOOD PRESSURE: 128 MMHG | BODY MASS INDEX: 23.19 KG/M2 | RESPIRATION RATE: 16 BRPM | TEMPERATURE: 98 F | HEART RATE: 99 BPM

## 2024-07-19 DIAGNOSIS — N52.9 ERECTILE DYSFUNCTION, UNSPECIFIED ERECTILE DYSFUNCTION TYPE: ICD-10-CM

## 2024-07-19 DIAGNOSIS — M87.051 AVASCULAR NECROSIS OF BONE OF RIGHT HIP (MULTI): ICD-10-CM

## 2024-07-19 DIAGNOSIS — I10 ESSENTIAL HYPERTENSION: Primary | ICD-10-CM

## 2024-07-19 DIAGNOSIS — F41.9 ANXIETY DISORDER, UNSPECIFIED TYPE: ICD-10-CM

## 2024-07-19 DIAGNOSIS — E11.69 TYPE 2 DIABETES MELLITUS WITH OTHER SPECIFIED COMPLICATION, WITHOUT LONG-TERM CURRENT USE OF INSULIN (MULTI): ICD-10-CM

## 2024-07-19 LAB
EST. AVERAGE GLUCOSE BLD GHB EST-MCNC: 131 MG/DL
HBA1C MFR BLD: 6.2 %

## 2024-07-19 PROCEDURE — 1036F TOBACCO NON-USER: CPT | Performed by: INTERNAL MEDICINE

## 2024-07-19 PROCEDURE — 36415 COLL VENOUS BLD VENIPUNCTURE: CPT | Performed by: INTERNAL MEDICINE

## 2024-07-19 PROCEDURE — 83036 HEMOGLOBIN GLYCOSYLATED A1C: CPT | Performed by: INTERNAL MEDICINE

## 2024-07-19 PROCEDURE — 3060F POS MICROALBUMINURIA REV: CPT | Performed by: INTERNAL MEDICINE

## 2024-07-19 PROCEDURE — 4010F ACE/ARB THERAPY RXD/TAKEN: CPT | Performed by: INTERNAL MEDICINE

## 2024-07-19 PROCEDURE — 3008F BODY MASS INDEX DOCD: CPT | Performed by: INTERNAL MEDICINE

## 2024-07-19 PROCEDURE — 3044F HG A1C LEVEL LT 7.0%: CPT | Performed by: INTERNAL MEDICINE

## 2024-07-19 PROCEDURE — 99213 OFFICE O/P EST LOW 20 MIN: CPT | Performed by: INTERNAL MEDICINE

## 2024-07-19 PROCEDURE — 3048F LDL-C <100 MG/DL: CPT | Performed by: INTERNAL MEDICINE

## 2024-07-19 PROCEDURE — 3080F DIAST BP >= 90 MM HG: CPT | Performed by: INTERNAL MEDICINE

## 2024-07-19 PROCEDURE — 3074F SYST BP LT 130 MM HG: CPT | Performed by: INTERNAL MEDICINE

## 2024-07-19 PROCEDURE — 84402 ASSAY OF FREE TESTOSTERONE: CPT | Performed by: INTERNAL MEDICINE

## 2024-07-19 RX ORDER — ALPRAZOLAM 0.5 MG/1
0.5 TABLET ORAL 2 TIMES DAILY
Qty: 60 TABLET | Refills: 2 | Status: SHIPPED | OUTPATIENT
Start: 2024-07-19

## 2024-07-19 SDOH — ECONOMIC STABILITY: FOOD INSECURITY: WITHIN THE PAST 12 MONTHS, YOU WORRIED THAT YOUR FOOD WOULD RUN OUT BEFORE YOU GOT MONEY TO BUY MORE.: NEVER TRUE

## 2024-07-19 SDOH — ECONOMIC STABILITY: FOOD INSECURITY: WITHIN THE PAST 12 MONTHS, THE FOOD YOU BOUGHT JUST DIDN'T LAST AND YOU DIDN'T HAVE MONEY TO GET MORE.: NEVER TRUE

## 2024-07-19 ASSESSMENT — ENCOUNTER SYMPTOMS
SHORTNESS OF BREATH: 0
LOSS OF SENSATION IN FEET: 0
ABDOMINAL PAIN: 0
FEVER: 0
OCCASIONAL FEELINGS OF UNSTEADINESS: 0
NAUSEA: 0
CHILLS: 0
DEPRESSION: 0
VOMITING: 0

## 2024-07-19 ASSESSMENT — PAIN SCALES - GENERAL: PAINLEVEL: 0-NO PAIN

## 2024-07-19 ASSESSMENT — LIFESTYLE VARIABLES: HOW OFTEN DO YOU HAVE SIX OR MORE DRINKS ON ONE OCCASION: LESS THAN MONTHLY

## 2024-07-19 NOTE — PROGRESS NOTES
"Subjective   Patient ID: Aakash Chambers is a 54 y.o. male who presents for Follow-up (3M).    Presents for follow up. No acute issues. Medicine for anxiety working at current dose.  Weight down 14 pounds intentional.         Review of Systems   Constitutional:  Negative for chills and fever.   Respiratory:  Negative for shortness of breath.    Cardiovascular:  Negative for chest pain.   Gastrointestinal:  Negative for abdominal pain, nausea and vomiting.       Objective   /90   Pulse 99   Temp 36.7 °C (98 °F)   Resp 16   Ht 1.778 m (5' 10\")   Wt 73.5 kg (162 lb)   SpO2 99%   BMI 23.24 kg/m²     Physical Exam  Gen appearance: well groomed in NAD  A & O: alert and oriented x 3  CV: RRR   Lungs: CTA bilaterally  Extr: no edema   Assessment/Plan   DM: check A1c  ED: check lab for   HTN: cont med  4.   Anxiety: cont med. No signs of abuse  5.   RTO Oct CPE     "

## 2024-07-22 ENCOUNTER — TELEMEDICINE (OUTPATIENT)
Dept: UROLOGY | Facility: HOSPITAL | Age: 54
End: 2024-07-22
Payer: COMMERCIAL

## 2024-07-22 DIAGNOSIS — E11.69 TYPE 2 DIABETES MELLITUS WITH OTHER SPECIFIED COMPLICATION, WITHOUT LONG-TERM CURRENT USE OF INSULIN (MULTI): ICD-10-CM

## 2024-07-22 DIAGNOSIS — N52.8 OTHER MALE ERECTILE DYSFUNCTION: ICD-10-CM

## 2024-07-22 DIAGNOSIS — R39.9 LOWER URINARY TRACT SYMPTOMS (LUTS): ICD-10-CM

## 2024-07-22 DIAGNOSIS — N52.9 ERECTILE DYSFUNCTION, UNSPECIFIED ERECTILE DYSFUNCTION TYPE: Primary | ICD-10-CM

## 2024-07-22 PROCEDURE — G2211 COMPLEX E/M VISIT ADD ON: HCPCS | Performed by: UROLOGY

## 2024-07-22 PROCEDURE — 99214 OFFICE O/P EST MOD 30 MIN: CPT | Performed by: UROLOGY

## 2024-07-22 PROCEDURE — 3048F LDL-C <100 MG/DL: CPT | Performed by: UROLOGY

## 2024-07-22 PROCEDURE — 3044F HG A1C LEVEL LT 7.0%: CPT | Performed by: UROLOGY

## 2024-07-22 PROCEDURE — 3060F POS MICROALBUMINURIA REV: CPT | Performed by: UROLOGY

## 2024-07-22 PROCEDURE — 4010F ACE/ARB THERAPY RXD/TAKEN: CPT | Performed by: UROLOGY

## 2024-07-22 RX ORDER — TADALAFIL 5 MG/1
5 TABLET ORAL DAILY
Qty: 30 TABLET | Refills: 11 | Status: SHIPPED | OUTPATIENT
Start: 2024-07-22

## 2024-07-22 RX ORDER — TADALAFIL 20 MG/1
20 TABLET ORAL AS NEEDED
Qty: 30 TABLET | Refills: 6 | Status: SHIPPED | OUTPATIENT
Start: 2024-07-22

## 2024-07-22 NOTE — PROGRESS NOTES
"Virtual or Telephone Consent    An interactive audio and video telecommunication system which permits real time communications between the patient (at the originating site) and provider (at the distant site) was utilized to provide this telehealth service.   Patient provided consent    Last visit 1/2024  ICI with mix 5    Today's visit:  #Erectile Dysfunction   -mix 5 --> tried 4 times at 10 units, didn't work well enough, no side effects  -doppler 1/2024 - 50% with 20 of 5  -PCP note since last visit reviewed      s/p prostatectomy: no  On nitrates/NTG?: No  Libido/Desire: Good  Have been on Testosterone /anabolic steroids? No    #luts  -getting up multiple times a night  -no blood  -no burning      Labs  Lab Results   Component Value Date    TESTOSTERONE 641 01/12/2024     Lab Results   Component Value Date    LH 5.5 01/12/2024     Lab Results   Component Value Date    FSH 2.5 01/12/2024     No components found for: \"ESTRADIAL\"  Lab Results   Component Value Date    PSA 0.42 03/02/2022     No components found for: \"CBC\"  Lab Results   Component Value Date    PROLACTIN 5.4 01/12/2024     Lab Results   Component Value Date    HGBA1C 6.2 (H) 07/19/2024     Lab Results   Component Value Date    HCT 46.0 01/12/2024           PMH:  No past medical history on file.     PSH:  No past surgical history on file.     Medications:    Current Outpatient Medications:     acetaminophen (Tylenol) 500 mg tablet, Take 2 tablets (1,000 mg) by mouth 3 times a day., Disp: , Rfl:     ALPRAZolam (Xanax) 0.5 mg tablet, Take 1 tablet (0.5 mg) by mouth 2 times a day., Disp: 60 tablet, Rfl: 2    aspirin 81 mg EC tablet, Take 1 tablet (81 mg) by mouth once daily., Disp: , Rfl:     atorvastatin (Lipitor) 40 mg tablet, Take 1 tablet by mouth once daily, Disp: 90 tablet, Rfl: 1    chlorthalidone (Hygroton) 25 mg tablet, Take 1 tablet by mouth once daily, Disp: 90 tablet, Rfl: 1    cholecalciferol (Vitamin D-3) 25 MCG (1000 UT) tablet, Take 1 " tablet (1,000 Units) by mouth once daily., Disp: 90 tablet, Rfl: 3    cyclobenzaprine (Flexeril) 10 mg tablet, Take 1 tablet (10 mg) by mouth as needed at bedtime for muscle spasms., Disp: 30 tablet, Rfl: 5    hydroCHLOROthiazide (HYDRODiuril) 25 mg tablet, Take 1 tablet (25 mg) by mouth once daily., Disp: , Rfl:     ibuprofen 800 mg tablet, Take 1 tablet (800 mg) by mouth 3 times a day as needed (WITH FOOD)., Disp: , Rfl:     indomethacin (Indocin) 50 mg capsule, Take 1 capsule (50 mg) by mouth 2 times a day., Disp: , Rfl:     lidocaine (Lidoderm) 5 % patch, Place 1 patch on the skin once every 24 hours., Disp: , Rfl:     lisinopril 40 mg tablet, Take 1 tablet by mouth once daily, Disp: 90 tablet, Rfl: 0    meloxicam (Mobic) 15 mg tablet, Take 1 tablet (15 mg) by mouth once daily., Disp: , Rfl:     metFORMIN (Glucophage) 500 mg tablet, Take 1 tablet by mouth once daily, Disp: 90 tablet, Rfl: 1    multivitamin tablet, Take 1 tablet by mouth once daily., Disp: , Rfl:     naloxone (Narcan) 4 mg/0.1 mL nasal spray, USE AS DIRECTED, Disp: , Rfl:     naproxen (Naprosyn) 500 mg tablet, Take 1 tablet (500 mg) by mouth 2 times a day as needed., Disp: , Rfl:     omeprazole (PriLOSEC) 40 mg DR capsule, TAKE 1 CAPSULE BY MOUTH ONCE DAILY FOR HEARTBURN, Disp: 90 capsule, Rfl: 1    potassium chloride CR 10 mEq ER tablet, Take 1 tablet (10 mEq) by mouth once daily., Disp: , Rfl:     raNITIdine in sodium chloride 0.9% solution, Take 30 mL (150 mg) by mouth., Disp: , Rfl:     tadalafil (Cialis) 5 mg tablet, Take 1 tablet by mouth once daily, Disp: 90 tablet, Rfl: 0    traMADol (Ultram) 50 mg tablet, Take 1 tablet (50 mg) by mouth 2 times a day as needed for moderate pain (4 - 6)., Disp: 60 tablet, Rfl: 2    Allergy:  No Known Allergies     Exam  CONSTITUTIONAL:        No acute distress    HEAD:        Normocephalic and atraumatic    CHEST / RESPIRATORY      no excess work of breathing, no respiratory distress,    ABDOMEN /  GASTROINTESTINAL:        Abdomen nondistended          Assessment/Plan  Severe erectile dysfunction: secondary to arterial insufficiency and corporo veno-occlusive dysfunction refractory to medical management with PDE's (Viagra, Cialis) and intracavernosal therapy      -stop ICI, patient failed mix 5, wants to try cialis again  -Trial of Cialis 20mg - medication counseling done. Can start with half tab. Discussed side effects including priapism, headaches, stuffiness, and back pain. Discussed that if he gets an erection >4 hours, he needs to present to the emergency room or risk worsening of his erectile dysfunction long term.     We discussed the different causes for urinary difficulties as patients age, specifically BPH, bladder overactivity, and even stricture disease. We discussed different medications that could help him with his symptoms, including alpha blockers and finasteride, as well as surgical options such as TURP and Urolift.  -cialis 5mg daily - med counseling done  Ucx    G [id number]  Visit complexity inherent to evaluation and management (E&M) associated with medical care services that serve as the continuing focal point for all needed health care services and/or with medical care services that are part of ongoing care related to a patient's single, serious condition or a complex condition.

## 2024-07-24 DIAGNOSIS — K21.9 GASTROESOPHAGEAL REFLUX DISEASE WITHOUT ESOPHAGITIS: ICD-10-CM

## 2024-07-24 LAB
TESTOSTERONE FREE (CHAN): 74.9 PG/ML (ref 35–155)
TESTOSTERONE,TOTAL,LC-MS/MS: 903 NG/DL (ref 250–1100)

## 2024-07-24 RX ORDER — OMEPRAZOLE 40 MG/1
CAPSULE, DELAYED RELEASE ORAL
Qty: 90 CAPSULE | Refills: 1 | Status: SHIPPED | OUTPATIENT
Start: 2024-07-24

## 2024-08-19 DIAGNOSIS — M87.051 AVASCULAR NECROSIS OF BONE OF RIGHT HIP (MULTI): ICD-10-CM

## 2024-08-20 ENCOUNTER — APPOINTMENT (OUTPATIENT)
Dept: UROLOGY | Facility: CLINIC | Age: 54
End: 2024-08-20
Payer: COMMERCIAL

## 2024-08-20 DIAGNOSIS — N52.9 ERECTILE DYSFUNCTION, UNSPECIFIED ERECTILE DYSFUNCTION TYPE: ICD-10-CM

## 2024-08-20 DIAGNOSIS — F41.9 ANXIETY DISORDER, UNSPECIFIED TYPE: Primary | ICD-10-CM

## 2024-08-20 PROCEDURE — 3044F HG A1C LEVEL LT 7.0%: CPT | Performed by: PSYCHOLOGIST

## 2024-08-20 PROCEDURE — 90791 PSYCH DIAGNOSTIC EVALUATION: CPT | Performed by: PSYCHOLOGIST

## 2024-08-20 PROCEDURE — 4010F ACE/ARB THERAPY RXD/TAKEN: CPT | Performed by: PSYCHOLOGIST

## 2024-08-20 PROCEDURE — 3048F LDL-C <100 MG/DL: CPT | Performed by: PSYCHOLOGIST

## 2024-08-20 PROCEDURE — 3060F POS MICROALBUMINURIA REV: CPT | Performed by: PSYCHOLOGIST

## 2024-08-20 RX ORDER — CYCLOBENZAPRINE HCL 10 MG
10 TABLET ORAL NIGHTLY PRN
Qty: 30 TABLET | Refills: 5 | Status: SHIPPED | OUTPATIENT
Start: 2024-08-20

## 2024-08-21 NOTE — PROGRESS NOTES
"Outpatient Psychology Initial Appointment  Subjective   Aakash Chambers, a 54 y.o. male, for initial evaluation visit. Participated in diagnostic interview and identification of goals for treatment.      Patient is referred by Westley Reese MD.      Reason:  He has been experiencing distress due to difficulties with erectile functioning.      Psychosocial History Aakash is currently presenting for psychological/sex therapy services under the recommendation of his urologist.  He states that he has been experiencing difficulties with erection for the past few years, which has been causing him some distress.  He has reportedly been prescribed Cialis, which has been minimally helpful as of late.  According to Aakash, he had an ultrasound done and has been found to have some difficulties with back flow of the blood in his erectile tissue.  His urologist reportedly had discussions with him regarding the option of a penile implant.  Aakash is currently hesitant to take that route of intervention and would like to \"try other methods\" first.  Aakash acknowledges that he has difficulty with the initial ability to get erections, and experiences anxiety regarding his performance.  He further acknowledges that he experiences anxiety in other areas of his life as well.      Aakash reports that he has been  for 17 years, and in a relationship with his wife for 20 years in total.  They have one 15 year old son.  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      Mental Status Evaluation:  Appearance:  age appropriate   Behavior:  normal   Speech:  normal pitch and normal volume   Mood:  normal   Affect:  normal   Thought Process:  normal   Thought Content:  normal   Sensorium:  person, place, time/date, situation, day of week, month of year, and year   Cognition:  grossly intact   Insight:  Intact, as evidenced by ability to express internal thoughts, " experiences, and insights.          Assessment/Plan     Diagnosis:   Patient Active Problem List   Diagnosis    Anxiety disorder    Avascular necrosis of bone of right hip (Multi)    BPH with obstruction/lower urinary tract symptoms    Erectile dysfunction    High cholesterol    Hyperglycemia    Essential hypertension    Hyponatremia    Low vitamin D level    Nocturia    Opioid dependence, continuous (Multi)    Pancreatitis ([facility])    Tachycardia    Diabetes (Multi)    BMI 24.0-24.9, adult    Long-term current use of benzodiazepine    Acquired hallux malleus of both feet    Alcohol-induced acute pancreatitis without infection or necrosis ([facility])    Periumbilical abdominal pain    Weakness of left hip    Leukocytosis    Instability of right foot joint    Instability of left foot joint    Hallux abducto valgus, bilateral    Foot joint hypermobility    Esophageal reflux    Bilateral foot pain    Benign tumor of skin of lower limb, including hip, right       Treatment Goals:  Specify outcomes written in observable, behavioral terms:   Anxiety: modifying schemata of threat/vulnerability/need for control (or other schemata -- specify sexual scripts) and reducing physical symptoms of anxiety  Improve sexual response; Improve sexual satisfaction.     Treatment Plan/Recommendations: Provided psychoeducation/psychosexual education regarding the overlay of mental health and sexual health as it pertains to patient's presenting concerns.  Teach cognitive and behavioral strategies towards reduction of anxiety and improves sexual response.  Teach intimacy skills towards improved sexual satisfaction.        Review with patient: Treatment plan reviewed with the patient.    Will Oliva PsyD

## 2024-09-13 DIAGNOSIS — F41.9 ANXIETY DISORDER, UNSPECIFIED TYPE: ICD-10-CM

## 2024-09-13 DIAGNOSIS — I10 PRIMARY HYPERTENSION: ICD-10-CM

## 2024-09-13 RX ORDER — LISINOPRIL 40 MG/1
40 TABLET ORAL DAILY
Qty: 90 TABLET | Refills: 1 | Status: SHIPPED | OUTPATIENT
Start: 2024-09-13

## 2024-09-13 RX ORDER — ATORVASTATIN CALCIUM 40 MG/1
40 TABLET, FILM COATED ORAL DAILY
Qty: 90 TABLET | Refills: 1 | Status: SHIPPED | OUTPATIENT
Start: 2024-09-13

## 2024-09-24 NOTE — PROGRESS NOTES
"Last visit 7/22/2024  -Trial of Cialis 20mg  cialis 5mg daily - med counseling done  [facility]    Today's visit:  -Dr Oliva's notes reviewed     #Erectile Dysfunction   -is back on Cialis 20mg and is adequate and tolerable, happier with this rather than injections  -he is able to have an erection and maintain it for penetration, but not fully happy  -mix 5 --> tried 4 times at 10 units, didn't work well enough, no side effects  -doppler 1/2024 - 50% with 20 of 5    s/p prostatectomy: no  On nitrates/NTG?: No  Libido/Desire: Good  Have been on Testosterone /anabolic steroids? No    #LUTS  -taking daily Cialis 5mg, tolerating it well without side effects  -denies hematuria and dysuria       Labs  Lab Results   Component Value Date    TESTOSTERONE 641 01/12/2024     Lab Results   Component Value Date    LH 5.5 01/12/2024     Lab Results   Component Value Date    FSH 2.5 01/12/2024     No components found for: \"ESTRADIAL\"  Lab Results   Component Value Date    PSA 0.42 03/02/2022     No components found for: \"CBC\"  Lab Results   Component Value Date    PROLACTIN 5.4 01/12/2024     Lab Results   Component Value Date    HGBA1C 6.2 (H) 07/19/2024     Lab Results   Component Value Date    HCT 46.0 01/12/2024         PMH:  No past medical history on file.     PSH:  No past surgical history on file.     Medications:    Current Outpatient Medications:     acetaminophen (Tylenol) 500 mg tablet, Take 2 tablets (1,000 mg) by mouth 3 times a day., Disp: , Rfl:     ALPRAZolam (Xanax) 0.5 mg tablet, Take 1 tablet (0.5 mg) by mouth 2 times a day., Disp: 60 tablet, Rfl: 2    aspirin 81 mg EC tablet, Take 1 tablet (81 mg) by mouth once daily., Disp: , Rfl:     atorvastatin (Lipitor) 40 mg tablet, Take 1 tablet by mouth once daily, Disp: 90 tablet, Rfl: 1    chlorthalidone (Hygroton) 25 mg tablet, Take 1 tablet by mouth once daily, Disp: 90 tablet, Rfl: 1    cholecalciferol (Vitamin D-3) 25 MCG (1000 UT) tablet, Take 1 tablet (1,000 Units) " by mouth once daily., Disp: 90 tablet, Rfl: 3    cyclobenzaprine (Flexeril) 10 mg tablet, TAKE 1 TABLET BY MOUTH AS NEEDED AT BEDTIME FOR MUSCLE SPASM, Disp: 30 tablet, Rfl: 5    hydroCHLOROthiazide (HYDRODiuril) 25 mg tablet, Take 1 tablet (25 mg) by mouth once daily., Disp: , Rfl:     ibuprofen 800 mg tablet, Take 1 tablet (800 mg) by mouth 3 times a day as needed (WITH FOOD)., Disp: , Rfl:     indomethacin (Indocin) 50 mg capsule, Take 1 capsule (50 mg) by mouth 2 times a day., Disp: , Rfl:     lidocaine (Lidoderm) 5 % patch, Place 1 patch on the skin once every 24 hours., Disp: , Rfl:     lisinopril 40 mg tablet, Take 1 tablet by mouth once daily, Disp: 90 tablet, Rfl: 1    meloxicam (Mobic) 15 mg tablet, Take 1 tablet (15 mg) by mouth once daily., Disp: , Rfl:     metFORMIN (Glucophage) 500 mg tablet, Take 1 tablet by mouth once daily, Disp: 90 tablet, Rfl: 1    multivitamin tablet, Take 1 tablet by mouth once daily., Disp: , Rfl:     naloxone (Narcan) 4 mg/0.1 mL nasal spray, USE AS DIRECTED, Disp: , Rfl:     naproxen (Naprosyn) 500 mg tablet, Take 1 tablet (500 mg) by mouth 2 times a day as needed., Disp: , Rfl:     omeprazole (PriLOSEC) 40 mg DR capsule, TAKE 1 CAPSULE BY MOUTH ONCE DAILY FOR HEARTBURN, Disp: 90 capsule, Rfl: 1    potassium chloride CR 10 mEq ER tablet, Take 1 tablet (10 mEq) by mouth once daily., Disp: , Rfl:     raNITIdine in sodium chloride 0.9% solution, Take 30 mL (150 mg) by mouth., Disp: , Rfl:     tadalafil (Cialis) 20 mg tablet, Take 1 tablet (20 mg) by mouth if needed for erectile dysfunction (Start with half tab. Take 2 hours prior to wanting erection.If you get an erection over 4 hours, go to the emergency room.)., Disp: 30 tablet, Rfl: 6    tadalafil (Cialis) 5 mg tablet, Take 1 tablet (5 mg) by mouth once daily., Disp: 30 tablet, Rfl: 11    traMADol (Ultram) 50 mg tablet, Take 1 tablet (50 mg) by mouth 2 times a day as needed for moderate pain (4 - 6)., Disp: 60 tablet, Rfl:  2    Allergy:  No Known Allergies     Exam  CONSTITUTIONAL:        No acute distress    HEAD:        Normocephalic and atraumatic    CHEST / RESPIRATORY      no excess work of breathing, no respiratory distress,    ABDOMEN / GASTROINTESTINAL:        Abdomen nondistended          Assessment/Plan  Severe erectile dysfunction: secondary to arterial insufficiency and corporo veno-occlusive dysfunction refractory to medical management with PDE's (Viagra, Cialis) and intracavernosal therapy   We had a long discussion regarding penile prosthesis, including risks, benefits, and alternatives. We discussed risks including but not limited to pain, bleeding, infection, damage to adjacent structures, need for further procedures, malfunction, erosion, extrusion, complications of anesthesia etc. We discussed the postoperative course and expectations, and specifically that after getting an implant, spontaneous erections do not occur other methods such as injections etc are no longer effective. We also discussed the effect of the implant on length and that it will likely be shorter than previous given age and duration of ED. Further, if he gets an infection or the implant has to be removed for any reason, there is potential for him to never have erections again. All questions were answered and reading materials were given. The patient was given models of both the inflatable and malleable implants, and his ability to squeeze the pump was also assessed.        -Continue Cialis 20mg - medication refilled. Can start with half tab. Discussed side effects including priapism, headaches, stuffiness, and back pain. Discussed that if he gets an erection >4 hours, he needs to present to the emergency room or risk worsening of his erectile dysfunction long term.     We discussed the different causes for urinary difficulties as patients age, specifically BPH, bladder overactivity, and even stricture disease. We discussed different medications  that could help him with his symptoms, including alpha blockers and finasteride, as well as surgical options such as TURP and Urolift.  -continue Cialis 5mg daily, medication refilled     Fu in 6 months       G 2211  Visit complexity inherent to evaluation and management (E&M) associated with medical care services that serve as the continuing focal point for all needed health care services and/or with medical care services that are part of ongoing care related to a patient's single, serious condition or a complex condition.    Scribe Attestation  By signing my name below, ICarly Scribe   attest that this documentation has been prepared under the direction and in the presence of Westley Reese MD.

## 2024-09-25 ENCOUNTER — APPOINTMENT (OUTPATIENT)
Dept: UROLOGY | Facility: CLINIC | Age: 54
End: 2024-09-25
Payer: COMMERCIAL

## 2024-09-25 VITALS — TEMPERATURE: 97.1 F | HEIGHT: 70 IN | WEIGHT: 160 LBS | BODY MASS INDEX: 22.9 KG/M2

## 2024-09-25 DIAGNOSIS — N52.9 ERECTILE DYSFUNCTION, UNSPECIFIED ERECTILE DYSFUNCTION TYPE: ICD-10-CM

## 2024-09-25 DIAGNOSIS — N52.8 OTHER MALE ERECTILE DYSFUNCTION: ICD-10-CM

## 2024-09-25 DIAGNOSIS — R39.9 LOWER URINARY TRACT SYMPTOMS (LUTS): ICD-10-CM

## 2024-09-25 PROCEDURE — 3044F HG A1C LEVEL LT 7.0%: CPT | Performed by: UROLOGY

## 2024-09-25 PROCEDURE — 3008F BODY MASS INDEX DOCD: CPT | Performed by: UROLOGY

## 2024-09-25 PROCEDURE — 3048F LDL-C <100 MG/DL: CPT | Performed by: UROLOGY

## 2024-09-25 PROCEDURE — 4010F ACE/ARB THERAPY RXD/TAKEN: CPT | Performed by: UROLOGY

## 2024-09-25 PROCEDURE — G2211 COMPLEX E/M VISIT ADD ON: HCPCS | Performed by: UROLOGY

## 2024-09-25 PROCEDURE — 3060F POS MICROALBUMINURIA REV: CPT | Performed by: UROLOGY

## 2024-09-25 PROCEDURE — 99213 OFFICE O/P EST LOW 20 MIN: CPT | Performed by: UROLOGY

## 2024-09-25 PROCEDURE — 1036F TOBACCO NON-USER: CPT | Performed by: UROLOGY

## 2024-09-25 RX ORDER — TADALAFIL 5 MG/1
5 TABLET ORAL DAILY
Qty: 30 TABLET | Refills: 11 | Status: SHIPPED | OUTPATIENT
Start: 2024-09-25

## 2024-09-25 RX ORDER — TADALAFIL 20 MG/1
20 TABLET ORAL AS NEEDED
Qty: 30 TABLET | Refills: 6 | Status: SHIPPED | OUTPATIENT
Start: 2024-09-25

## 2024-09-25 ASSESSMENT — PAIN SCALES - GENERAL: PAINLEVEL: 0-NO PAIN

## 2024-10-30 ENCOUNTER — OFFICE VISIT (OUTPATIENT)
Dept: PRIMARY CARE | Facility: CLINIC | Age: 54
End: 2024-10-30
Payer: COMMERCIAL

## 2024-10-30 VITALS
TEMPERATURE: 98.5 F | BODY MASS INDEX: 23.16 KG/M2 | WEIGHT: 161.8 LBS | OXYGEN SATURATION: 100 % | HEIGHT: 70 IN | SYSTOLIC BLOOD PRESSURE: 118 MMHG | RESPIRATION RATE: 16 BRPM | HEART RATE: 110 BPM | DIASTOLIC BLOOD PRESSURE: 78 MMHG

## 2024-10-30 DIAGNOSIS — Z00.00 ENCOUNTER FOR PREVENTIVE HEALTH EXAMINATION: Primary | ICD-10-CM

## 2024-10-30 LAB
25(OH)D3 SERPL-MCNC: 44 NG/ML (ref 30–100)
ALBUMIN SERPL BCP-MCNC: 4.5 G/DL (ref 3.4–5)
ALP SERPL-CCNC: 87 U/L (ref 33–120)
ALT SERPL W P-5'-P-CCNC: 33 U/L (ref 10–52)
ANION GAP SERPL CALC-SCNC: 18 MMOL/L (ref 10–20)
AST SERPL W P-5'-P-CCNC: 32 U/L (ref 9–39)
BILIRUB SERPL-MCNC: 0.6 MG/DL (ref 0–1.2)
BUN SERPL-MCNC: 9 MG/DL (ref 6–23)
CALCIUM SERPL-MCNC: 9.6 MG/DL (ref 8.6–10.6)
CHLORIDE SERPL-SCNC: 95 MMOL/L (ref 98–107)
CHOLEST SERPL-MCNC: 136 MG/DL (ref 0–199)
CHOLESTEROL/HDL RATIO: 1.9
CO2 SERPL-SCNC: 26 MMOL/L (ref 21–32)
CREAT SERPL-MCNC: 0.76 MG/DL (ref 0.5–1.3)
EGFRCR SERPLBLD CKD-EPI 2021: >90 ML/MIN/1.73M*2
ERYTHROCYTE [DISTWIDTH] IN BLOOD BY AUTOMATED COUNT: 12 % (ref 11.5–14.5)
EST. AVERAGE GLUCOSE BLD GHB EST-MCNC: 123 MG/DL
GLUCOSE SERPL-MCNC: 106 MG/DL (ref 74–99)
HBA1C MFR BLD: 5.9 %
HCT VFR BLD AUTO: 42 % (ref 41–52)
HDLC SERPL-MCNC: 71.7 MG/DL
HGB BLD-MCNC: 14.4 G/DL (ref 13.5–17.5)
LDLC SERPL CALC-MCNC: 39 MG/DL
MCH RBC QN AUTO: 31.9 PG (ref 26–34)
MCHC RBC AUTO-ENTMCNC: 34.3 G/DL (ref 32–36)
MCV RBC AUTO: 93 FL (ref 80–100)
NON HDL CHOLESTEROL: 64 MG/DL (ref 0–149)
NRBC BLD-RTO: 0 /100 WBCS (ref 0–0)
PLATELET # BLD AUTO: 379 X10*3/UL (ref 150–450)
POTASSIUM SERPL-SCNC: 3.8 MMOL/L (ref 3.5–5.3)
PROT SERPL-MCNC: 7.3 G/DL (ref 6.4–8.2)
RBC # BLD AUTO: 4.52 X10*6/UL (ref 4.5–5.9)
SODIUM SERPL-SCNC: 135 MMOL/L (ref 136–145)
TRIGL SERPL-MCNC: 125 MG/DL (ref 0–149)
TSH SERPL-ACNC: 0.76 MIU/L (ref 0.44–3.98)
VLDL: 25 MG/DL (ref 0–40)
WBC # BLD AUTO: 6.4 X10*3/UL (ref 4.4–11.3)

## 2024-10-30 PROCEDURE — 80053 COMPREHEN METABOLIC PANEL: CPT | Performed by: INTERNAL MEDICINE

## 2024-10-30 PROCEDURE — 99396 PREV VISIT EST AGE 40-64: CPT | Performed by: INTERNAL MEDICINE

## 2024-10-30 PROCEDURE — 80061 LIPID PANEL: CPT | Performed by: INTERNAL MEDICINE

## 2024-10-30 PROCEDURE — 3074F SYST BP LT 130 MM HG: CPT | Performed by: INTERNAL MEDICINE

## 2024-10-30 PROCEDURE — 3078F DIAST BP <80 MM HG: CPT | Performed by: INTERNAL MEDICINE

## 2024-10-30 PROCEDURE — 4010F ACE/ARB THERAPY RXD/TAKEN: CPT | Performed by: INTERNAL MEDICINE

## 2024-10-30 PROCEDURE — 3060F POS MICROALBUMINURIA REV: CPT | Performed by: INTERNAL MEDICINE

## 2024-10-30 PROCEDURE — 82306 VITAMIN D 25 HYDROXY: CPT | Performed by: INTERNAL MEDICINE

## 2024-10-30 PROCEDURE — 3008F BODY MASS INDEX DOCD: CPT | Performed by: INTERNAL MEDICINE

## 2024-10-30 PROCEDURE — 36415 COLL VENOUS BLD VENIPUNCTURE: CPT | Performed by: INTERNAL MEDICINE

## 2024-10-30 PROCEDURE — 83036 HEMOGLOBIN GLYCOSYLATED A1C: CPT | Performed by: INTERNAL MEDICINE

## 2024-10-30 PROCEDURE — 3044F HG A1C LEVEL LT 7.0%: CPT | Performed by: INTERNAL MEDICINE

## 2024-10-30 PROCEDURE — 3048F LDL-C <100 MG/DL: CPT | Performed by: INTERNAL MEDICINE

## 2024-10-30 PROCEDURE — 85027 COMPLETE CBC AUTOMATED: CPT | Performed by: INTERNAL MEDICINE

## 2024-10-30 PROCEDURE — 1036F TOBACCO NON-USER: CPT | Performed by: INTERNAL MEDICINE

## 2024-10-30 PROCEDURE — 84443 ASSAY THYROID STIM HORMONE: CPT | Performed by: INTERNAL MEDICINE

## 2024-10-30 SDOH — ECONOMIC STABILITY: FOOD INSECURITY: WITHIN THE PAST 12 MONTHS, YOU WORRIED THAT YOUR FOOD WOULD RUN OUT BEFORE YOU GOT MONEY TO BUY MORE.: NEVER TRUE

## 2024-10-30 SDOH — ECONOMIC STABILITY: FOOD INSECURITY: WITHIN THE PAST 12 MONTHS, THE FOOD YOU BOUGHT JUST DIDN'T LAST AND YOU DIDN'T HAVE MONEY TO GET MORE.: NEVER TRUE

## 2024-10-30 ASSESSMENT — ENCOUNTER SYMPTOMS
CHILLS: 0
ABDOMINAL PAIN: 0
SHORTNESS OF BREATH: 0
LOSS OF SENSATION IN FEET: 0
VOMITING: 0
FEVER: 0
NAUSEA: 0
DEPRESSION: 0
OCCASIONAL FEELINGS OF UNSTEADINESS: 0

## 2024-10-30 ASSESSMENT — LIFESTYLE VARIABLES: HOW OFTEN DO YOU HAVE SIX OR MORE DRINKS ON ONE OCCASION: LESS THAN MONTHLY

## 2024-10-30 ASSESSMENT — PATIENT HEALTH QUESTIONNAIRE - PHQ9
2. FEELING DOWN, DEPRESSED OR HOPELESS: NOT AT ALL
SUM OF ALL RESPONSES TO PHQ9 QUESTIONS 1 AND 2: 0
1. LITTLE INTEREST OR PLEASURE IN DOING THINGS: NOT AT ALL

## 2024-10-30 ASSESSMENT — PAIN SCALES - GENERAL: PAINLEVEL_OUTOF10: 0-NO PAIN

## 2024-11-02 DIAGNOSIS — M87.051 AVASCULAR NECROSIS OF BONE OF RIGHT HIP (MULTI): ICD-10-CM

## 2024-11-03 DIAGNOSIS — I10 ESSENTIAL HYPERTENSION: ICD-10-CM

## 2024-11-04 RX ORDER — TRAMADOL HYDROCHLORIDE 50 MG/1
50 TABLET ORAL 2 TIMES DAILY PRN
Qty: 60 TABLET | Refills: 2 | Status: SHIPPED | OUTPATIENT
Start: 2024-11-04

## 2024-11-04 RX ORDER — CHLORTHALIDONE 25 MG/1
25 TABLET ORAL DAILY
Qty: 90 TABLET | Refills: 1 | Status: SHIPPED | OUTPATIENT
Start: 2024-11-04

## 2024-11-14 DIAGNOSIS — M87.051 AVASCULAR NECROSIS OF BONE OF RIGHT HIP (MULTI): Primary | ICD-10-CM

## 2024-11-14 RX ORDER — IBUPROFEN 800 MG/1
800 TABLET ORAL 3 TIMES DAILY PRN
Qty: 90 TABLET | Refills: 3 | Status: SHIPPED | OUTPATIENT
Start: 2024-11-14

## 2024-11-20 DIAGNOSIS — F41.9 ANXIETY DISORDER, UNSPECIFIED TYPE: ICD-10-CM

## 2024-11-25 DIAGNOSIS — F41.9 ANXIETY DISORDER, UNSPECIFIED TYPE: ICD-10-CM

## 2024-11-25 RX ORDER — ALPRAZOLAM 0.5 MG/1
0.5 TABLET ORAL 2 TIMES DAILY
Qty: 60 TABLET | Refills: 0 | OUTPATIENT
Start: 2024-11-25

## 2024-11-27 RX ORDER — ALPRAZOLAM 0.5 MG/1
0.5 TABLET ORAL 2 TIMES DAILY
Qty: 60 TABLET | Refills: 2 | Status: SHIPPED | OUTPATIENT
Start: 2024-11-27

## 2024-11-27 RX ORDER — ALPRAZOLAM 0.5 MG/1
0.5 TABLET ORAL 2 TIMES DAILY
Qty: 60 TABLET | Refills: 2 | OUTPATIENT
Start: 2024-11-27

## 2024-12-29 DIAGNOSIS — E11.9 TYPE 2 DIABETES MELLITUS WITHOUT COMPLICATION, WITHOUT LONG-TERM CURRENT USE OF INSULIN (MULTI): ICD-10-CM

## 2024-12-31 RX ORDER — METFORMIN HYDROCHLORIDE 500 MG/1
500 TABLET ORAL DAILY
Qty: 90 TABLET | Refills: 1 | Status: SHIPPED | OUTPATIENT
Start: 2024-12-31

## 2025-01-24 ENCOUNTER — OFFICE VISIT (OUTPATIENT)
Dept: PRIMARY CARE | Facility: CLINIC | Age: 55
End: 2025-01-24
Payer: COMMERCIAL

## 2025-01-24 VITALS
WEIGHT: 166.8 LBS | BODY MASS INDEX: 23.88 KG/M2 | RESPIRATION RATE: 16 BRPM | OXYGEN SATURATION: 98 % | DIASTOLIC BLOOD PRESSURE: 79 MMHG | HEIGHT: 70 IN | HEART RATE: 93 BPM | TEMPERATURE: 98.5 F | SYSTOLIC BLOOD PRESSURE: 118 MMHG

## 2025-01-24 DIAGNOSIS — M87.051 AVASCULAR NECROSIS OF BONE OF RIGHT HIP (MULTI): ICD-10-CM

## 2025-01-24 DIAGNOSIS — F41.9 ANXIETY DISORDER, UNSPECIFIED TYPE: Primary | ICD-10-CM

## 2025-01-24 DIAGNOSIS — E11.69 TYPE 2 DIABETES MELLITUS WITH OTHER SPECIFIED COMPLICATION, WITHOUT LONG-TERM CURRENT USE OF INSULIN: ICD-10-CM

## 2025-01-24 DIAGNOSIS — F11.20 OPIOID DEPENDENCE, CONTINUOUS (MULTI): ICD-10-CM

## 2025-01-24 DIAGNOSIS — I10 ESSENTIAL HYPERTENSION: ICD-10-CM

## 2025-01-24 PROBLEM — K85.90 PANCREATITIS (HHS-HCC): Status: RESOLVED | Noted: 2023-09-05 | Resolved: 2025-01-24

## 2025-01-24 PROCEDURE — 4010F ACE/ARB THERAPY RXD/TAKEN: CPT | Performed by: INTERNAL MEDICINE

## 2025-01-24 PROCEDURE — 99214 OFFICE O/P EST MOD 30 MIN: CPT | Performed by: INTERNAL MEDICINE

## 2025-01-24 PROCEDURE — 3078F DIAST BP <80 MM HG: CPT | Performed by: INTERNAL MEDICINE

## 2025-01-24 PROCEDURE — 1036F TOBACCO NON-USER: CPT | Performed by: INTERNAL MEDICINE

## 2025-01-24 PROCEDURE — 3008F BODY MASS INDEX DOCD: CPT | Performed by: INTERNAL MEDICINE

## 2025-01-24 PROCEDURE — 3074F SYST BP LT 130 MM HG: CPT | Performed by: INTERNAL MEDICINE

## 2025-01-24 SDOH — ECONOMIC STABILITY: FOOD INSECURITY: WITHIN THE PAST 12 MONTHS, THE FOOD YOU BOUGHT JUST DIDN'T LAST AND YOU DIDN'T HAVE MONEY TO GET MORE.: NEVER TRUE

## 2025-01-24 SDOH — ECONOMIC STABILITY: FOOD INSECURITY: WITHIN THE PAST 12 MONTHS, YOU WORRIED THAT YOUR FOOD WOULD RUN OUT BEFORE YOU GOT MONEY TO BUY MORE.: NEVER TRUE

## 2025-01-24 ASSESSMENT — ANXIETY QUESTIONNAIRES
3. WORRYING TOO MUCH ABOUT DIFFERENT THINGS: NOT AT ALL
6. BECOMING EASILY ANNOYED OR IRRITABLE: NOT AT ALL
2. NOT BEING ABLE TO STOP OR CONTROL WORRYING: NOT AT ALL
7. FEELING AFRAID AS IF SOMETHING AWFUL MIGHT HAPPEN: NOT AT ALL
4. TROUBLE RELAXING: NOT AT ALL
GAD7 TOTAL SCORE: 0
5. BEING SO RESTLESS THAT IT IS HARD TO SIT STILL: NOT AT ALL
1. FEELING NERVOUS, ANXIOUS, OR ON EDGE: NOT AT ALL

## 2025-01-24 ASSESSMENT — ENCOUNTER SYMPTOMS
OCCASIONAL FEELINGS OF UNSTEADINESS: 0
FEVER: 0
DEPRESSION: 0
CHILLS: 0
LOSS OF SENSATION IN FEET: 0
NAUSEA: 0
VOMITING: 0
SHORTNESS OF BREATH: 0
ABDOMINAL PAIN: 0

## 2025-01-24 ASSESSMENT — PAIN SCALES - GENERAL: PAINLEVEL_OUTOF10: 0-NO PAIN

## 2025-01-24 ASSESSMENT — LIFESTYLE VARIABLES: HOW MANY STANDARD DRINKS CONTAINING ALCOHOL DO YOU HAVE ON A TYPICAL DAY: PATIENT DOES NOT DRINK

## 2025-01-24 NOTE — PROGRESS NOTES
"Subjective   Patient ID: Aakash Chambers is a 54 y.o. male who presents for Follow-up.    Presents for follow up. No acute issues.          Review of Systems   Constitutional:  Negative for chills and fever.   Respiratory:  Negative for shortness of breath.    Cardiovascular:  Negative for chest pain.   Gastrointestinal:  Negative for abdominal pain, nausea and vomiting.       Objective   /79   Pulse 93   Temp 36.9 °C (98.5 °F) (Temporal)   Resp 16   Ht 1.778 m (5' 10\")   Wt 75.7 kg (166 lb 12.8 oz)   SpO2 98%   BMI 23.93 kg/m²     Physical Exam  Gen appearance: well groomed in NAD  A & O: alert and oriented x 3  CV: RRR   Lungs: CTA bilaterally  Extr: no edema   Assessment/Plan   Anxiety: controlled on current dose  of med. No signs of abuse.  HTN: cont med  DM: check A1c  Need UDS  Hip pain: controlled with current dose of med. No signs of abuse  RTO 3 mos  Greater than 35 minutes were spent on the care and coordination of care of the patient.        "

## 2025-01-25 DIAGNOSIS — K21.9 GASTROESOPHAGEAL REFLUX DISEASE WITHOUT ESOPHAGITIS: ICD-10-CM

## 2025-01-27 RX ORDER — OMEPRAZOLE 40 MG/1
CAPSULE, DELAYED RELEASE ORAL
Qty: 90 CAPSULE | Refills: 1 | Status: SHIPPED | OUTPATIENT
Start: 2025-01-27

## 2025-01-28 ENCOUNTER — CLINICAL SUPPORT (OUTPATIENT)
Dept: PRIMARY CARE | Facility: CLINIC | Age: 55
End: 2025-01-28
Payer: COMMERCIAL

## 2025-01-28 DIAGNOSIS — F11.20 OPIOID DEPENDENCE, CONTINUOUS (MULTI): ICD-10-CM

## 2025-01-28 LAB
AMPHETAMINES UR QL SCN: NORMAL
BARBITURATES UR QL SCN: NORMAL
BZE UR QL SCN: NORMAL
CANNABINOIDS UR QL SCN: NORMAL
CREAT UR-MCNC: 47.3 MG/DL (ref 20–370)
PCP UR QL SCN: NORMAL

## 2025-01-28 PROCEDURE — 80365 DRUG SCREENING OXYCODONE: CPT

## 2025-01-28 PROCEDURE — 80307 DRUG TEST PRSMV CHEM ANLYZR: CPT

## 2025-01-31 LAB
1OH-MIDAZOLAM UR CFM-MCNC: <25 NG/ML
6MAM UR CFM-MCNC: <25 NG/ML
7AMINOCLONAZEPAM UR CFM-MCNC: <25 NG/ML
A-OH ALPRAZ UR CFM-MCNC: 66 NG/ML
ALPRAZ UR CFM-MCNC: 41 NG/ML
CHLORDIAZEP UR CFM-MCNC: <25 NG/ML
CLONAZEPAM UR CFM-MCNC: <25 NG/ML
CODEINE UR CFM-MCNC: <50 NG/ML
DIAZEPAM UR CFM-MCNC: <25 NG/ML
EDDP UR CFM-MCNC: <25 NG/ML
FENTANYL UR CFM-MCNC: <2.5 NG/ML
HYDROCODONE CTO UR CFM-MCNC: <25 NG/ML
HYDROMORPHONE UR CFM-MCNC: <25 NG/ML
LORAZEPAM UR CFM-MCNC: <25 NG/ML
METHADONE UR CFM-MCNC: <25 NG/ML
MIDAZOLAM UR CFM-MCNC: <25 NG/ML
MORPHINE UR CFM-MCNC: <50 NG/ML
NORDIAZEPAM UR CFM-MCNC: <25 NG/ML
NORFENTANYL UR CFM-MCNC: <2.5 NG/ML
NORHYDROCODONE UR CFM-MCNC: <25 NG/ML
NOROXYCODONE UR CFM-MCNC: <25 NG/ML
NORTRAMADOL UR-MCNC: >1000 NG/ML
OXAZEPAM UR CFM-MCNC: <25 NG/ML
OXYCODONE UR CFM-MCNC: <25 NG/ML
OXYMORPHONE UR CFM-MCNC: <25 NG/ML
TEMAZEPAM UR CFM-MCNC: <25 NG/ML
TRAMADOL UR CFM-MCNC: >1000 NG/ML
ZOLPIDEM UR CFM-MCNC: <25 NG/ML
ZOLPIDEM UR-MCNC: <25 NG/ML

## 2025-02-24 DIAGNOSIS — F41.9 ANXIETY DISORDER, UNSPECIFIED TYPE: ICD-10-CM

## 2025-02-24 DIAGNOSIS — M87.051 AVASCULAR NECROSIS OF BONE OF RIGHT HIP (MULTI): ICD-10-CM

## 2025-02-25 RX ORDER — CYCLOBENZAPRINE HCL 10 MG
10 TABLET ORAL NIGHTLY PRN
Qty: 90 TABLET | Refills: 1 | Status: SHIPPED | OUTPATIENT
Start: 2025-02-25

## 2025-02-25 RX ORDER — ALPRAZOLAM 0.5 MG/1
0.5 TABLET ORAL 2 TIMES DAILY
Qty: 60 TABLET | Refills: 2 | Status: SHIPPED | OUTPATIENT
Start: 2025-02-25

## 2025-03-12 DIAGNOSIS — F41.9 ANXIETY DISORDER, UNSPECIFIED TYPE: ICD-10-CM

## 2025-03-12 DIAGNOSIS — I10 PRIMARY HYPERTENSION: ICD-10-CM

## 2025-03-12 RX ORDER — LISINOPRIL 40 MG/1
40 TABLET ORAL DAILY
Qty: 90 TABLET | Refills: 1 | Status: SHIPPED | OUTPATIENT
Start: 2025-03-12

## 2025-03-12 RX ORDER — ATORVASTATIN CALCIUM 40 MG/1
40 TABLET, FILM COATED ORAL DAILY
Qty: 90 TABLET | Refills: 1 | Status: SHIPPED | OUTPATIENT
Start: 2025-03-12

## 2025-03-25 ENCOUNTER — APPOINTMENT (OUTPATIENT)
Dept: UROLOGY | Facility: CLINIC | Age: 55
End: 2025-03-25
Payer: COMMERCIAL

## 2025-04-01 ENCOUNTER — APPOINTMENT (OUTPATIENT)
Dept: UROLOGY | Facility: CLINIC | Age: 55
End: 2025-04-01
Payer: COMMERCIAL

## 2025-04-20 NOTE — PROGRESS NOTES
"Last visit 9/25/24:  -Cialis 5mg for BPH  -Continue Cialis 20mg for ED  -Fu in 6 months     Today's visit:    #Erectile Dysfunction   -is back on Cialis 20mg and is adequate and tolerable, happier with this rather than injections  -he is able to have an erection and maintain it for penetration, but not fully happy  -mix 5 --> tried 4 times at 10 units, didn't work well enough, no side effects  -doppler 1/2024 - 50% with 20 of 5    s/p prostatectomy: no  On nitrates/NTG?: No  Libido/Desire: Good  Have been on Testosterone /anabolic steroids? No    #LUTS  -taking daily Cialis 5mg, tolerating it well without side effects  -denies hematuria and dysuria       Labs  Lab Results   Component Value Date    TESTOSTERONE 641 01/12/2024     Lab Results   Component Value Date    LH 5.5 01/12/2024     Lab Results   Component Value Date    FSH 2.5 01/12/2024     No components found for: \"ESTRADIAL\"  Lab Results   Component Value Date    PSA 0.42 03/02/2022     No components found for: \"CBC\"  Lab Results   Component Value Date    PROLACTIN 5.4 01/12/2024     Lab Results   Component Value Date    HGBA1C 5.9 (H) 10/30/2024     Lab Results   Component Value Date    HCT 42.0 10/30/2024         PMH:  Past Medical History:   Diagnosis Date    Alcohol-induced acute pancreatitis without infection or necrosis ([facility]) 10/22/2016    Pancreatitis ([facility]) 09/05/2023        PSH:  No past surgical history on file.     Medications:    Current Outpatient Medications:     ALPRAZolam (Xanax) 0.5 mg tablet, Take 1 tablet by mouth twice daily, Disp: 60 tablet, Rfl: 2    ammonium lactate (Amlactin) 12 % cream, Apply topically 2 times a day. to affected area, Disp: , Rfl:     aspirin 81 mg EC tablet, Take 1 tablet (81 mg) by mouth once daily., Disp: , Rfl:     atorvastatin (Lipitor) 40 mg tablet, Take 1 tablet by mouth once daily, Disp: 90 tablet, Rfl: 1    chlorthalidone (Hygroton) 25 mg tablet, Take 1 tablet by mouth once daily, Disp: 90 tablet, " Rfl: 1    cholecalciferol (Vitamin D-3) 25 MCG (1000 UT) tablet, Take 1 tablet (1,000 Units) by mouth once daily., Disp: 90 tablet, Rfl: 3    cyclobenzaprine (Flexeril) 10 mg tablet, TAKE 1 TABLET BY MOUTH AS NEEDED AT BEDTIME FOR MUSCLE SPASM, Disp: 90 tablet, Rfl: 1    ibuprofen 800 mg tablet, Take 1 tablet (800 mg) by mouth 3 times a day as needed (WITH FOOD)., Disp: 90 tablet, Rfl: 3    indomethacin (Indocin) 50 mg capsule, Take 1 capsule (50 mg) by mouth 2 times a day., Disp: , Rfl:     lisinopril 40 mg tablet, Take 1 tablet by mouth once daily, Disp: 90 tablet, Rfl: 1    metFORMIN (Glucophage) 500 mg tablet, Take 1 tablet by mouth once daily, Disp: 90 tablet, Rfl: 1    multivitamin tablet, Take 1 tablet by mouth once daily., Disp: , Rfl:     omeprazole (PriLOSEC) 40 mg DR capsule, TAKE 1 CAPSULE BY MOUTH ONCE DAILY FOR HEARTBURN, Disp: 90 capsule, Rfl: 1    tadalafil (Cialis) 20 mg tablet, Take 1 tablet (20 mg) by mouth if needed for erectile dysfunction (Start with half tab. Take 2 hours prior to wanting erection.If you get an erection over 4 hours, go to the emergency room.)., Disp: 30 tablet, Rfl: 6    tadalafil (Cialis) 5 mg tablet, Take 1 tablet (5 mg) by mouth once daily., Disp: 30 tablet, Rfl: 11    traMADol (Ultram) 50 mg tablet, Take 1 tablet (50 mg) by mouth 2 times a day as needed for moderate pain (4 - 6)., Disp: 60 tablet, Rfl: 2    naloxone (Narcan) 4 mg/0.1 mL nasal spray, USE AS DIRECTED, Disp: , Rfl:     Allergy:  No Known Allergies     Exam  Testicles descended bilaterally, nontender, no masses  Vasa palpable bilaterally  Penis circ'd, no lesions, no plaques          Assessment/Plan  Severe erectile dysfunction: secondary to arterial insufficiency and corporo veno-occlusive dysfunction refractory to medical management with PDE's (Viagra, Cialis) and intracavernosal therapy .  We had a long discussion regarding penile prosthesis, including risks, benefits, and alternatives. We discussed risks  including but not limited to pain, bleeding, infection, damage to adjacent structures, need for further procedures, malfunction, erosion, extrusion, complications of anesthesia etc. We discussed the postoperative course and expectations, and specifically that after getting an implant, spontaneous erections do not occur other methods such as injections etc are no longer effective. We also discussed the effect of the implant on length and that it will likely be shorter than previous given age and duration of ED. Further, if he gets an infection or the implant has to be removed for any reason, there is potential for him to never have erections again. All questions were answered and reading materials were given. The patient was given models of both the inflatable  implants, and his ability to squeeze the pump was also assessed.     -will consider inflatable penile prosthesis  - educational info given  -penoscrotal  -Coloplast  -vanc/zosyn  -space retzius for reservoir  -will check insurance benefits    #BPH  -continue Cialis 5mg daily    Fuv in 1 month for repeat implant consult    G [id number]  Visit complexity inherent to evaluation and management (E&M) associated with medical care services that serve as the continuing focal point for all needed health care services and/or with medical care services that are part of ongoing care related to a patient's single, serious condition or a complex condition.    Scribe Attestation  By signing my name below, ICarly , [place]   attest that this documentation has been prepared under the direction and in the presence of Westley Reese MD.

## 2025-04-22 ENCOUNTER — APPOINTMENT (OUTPATIENT)
Dept: UROLOGY | Facility: CLINIC | Age: 55
End: 2025-04-22
Payer: COMMERCIAL

## 2025-04-22 VITALS — TEMPERATURE: 98 F

## 2025-04-22 DIAGNOSIS — N13.8 BPH WITH OBSTRUCTION/LOWER URINARY TRACT SYMPTOMS: ICD-10-CM

## 2025-04-22 DIAGNOSIS — R35.1 NOCTURIA: ICD-10-CM

## 2025-04-22 DIAGNOSIS — N52.9 ERECTILE DYSFUNCTION, UNSPECIFIED ERECTILE DYSFUNCTION TYPE: Primary | ICD-10-CM

## 2025-04-22 DIAGNOSIS — N40.1 BPH WITH OBSTRUCTION/LOWER URINARY TRACT SYMPTOMS: ICD-10-CM

## 2025-04-22 PROCEDURE — 99214 OFFICE O/P EST MOD 30 MIN: CPT | Performed by: UROLOGY

## 2025-04-22 PROCEDURE — 3060F POS MICROALBUMINURIA REV: CPT | Performed by: UROLOGY

## 2025-04-22 PROCEDURE — 1036F TOBACCO NON-USER: CPT | Performed by: UROLOGY

## 2025-04-22 PROCEDURE — G2211 COMPLEX E/M VISIT ADD ON: HCPCS | Performed by: UROLOGY

## 2025-04-22 PROCEDURE — 4010F ACE/ARB THERAPY RXD/TAKEN: CPT | Performed by: UROLOGY

## 2025-04-22 RX ORDER — AMMONIUM LACTATE 12 G/100G
CREAM TOPICAL 2 TIMES DAILY
COMMUNITY
Start: 2025-01-08

## 2025-04-23 ENCOUNTER — HOSPITAL ENCOUNTER (OUTPATIENT)
Facility: HOSPITAL | Age: 55
Setting detail: OUTPATIENT SURGERY
End: 2025-04-23
Attending: UROLOGY | Admitting: UROLOGY
Payer: COMMERCIAL

## 2025-04-23 ENCOUNTER — PREP FOR PROCEDURE (OUTPATIENT)
Dept: UROLOGY | Facility: CLINIC | Age: 55
End: 2025-04-23
Payer: COMMERCIAL

## 2025-04-23 VITALS — BODY MASS INDEX: 23.95 KG/M2 | WEIGHT: 166.89 LBS

## 2025-04-23 DIAGNOSIS — N52.9 ERECTILE DYSFUNCTION, UNSPECIFIED ERECTILE DYSFUNCTION TYPE: Primary | ICD-10-CM

## 2025-04-23 RX ORDER — GABAPENTIN 300 MG/1
600 CAPSULE ORAL ONCE
OUTPATIENT
Start: 2025-04-23 | End: 2025-04-23

## 2025-04-23 RX ORDER — CELECOXIB 400 MG/1
400 CAPSULE ORAL ONCE
OUTPATIENT
Start: 2025-04-23 | End: 2025-04-23

## 2025-04-23 RX ORDER — VANCOMYCIN HYDROCHLORIDE 1 G/200ML
1000 INJECTION, SOLUTION INTRAVENOUS ONCE
OUTPATIENT
Start: 2025-04-23 | End: 2025-04-23

## 2025-04-23 RX ORDER — ACETAMINOPHEN 325 MG/1
975 TABLET ORAL ONCE
OUTPATIENT
Start: 2025-04-23 | End: 2025-04-23

## 2025-04-23 RX ORDER — CHLORHEXIDINE GLUCONATE 40 MG/ML
SOLUTION TOPICAL DAILY PRN
OUTPATIENT
Start: 2025-04-23

## 2025-04-27 DIAGNOSIS — I10 ESSENTIAL HYPERTENSION: ICD-10-CM

## 2025-04-28 DIAGNOSIS — M87.051 AVASCULAR NECROSIS OF BONE OF RIGHT HIP (MULTI): ICD-10-CM

## 2025-04-28 RX ORDER — CHLORTHALIDONE 25 MG/1
25 TABLET ORAL DAILY
Qty: 90 TABLET | Refills: 1 | Status: SHIPPED | OUTPATIENT
Start: 2025-04-28

## 2025-04-29 RX ORDER — TRAMADOL HYDROCHLORIDE 50 MG/1
50 TABLET ORAL 2 TIMES DAILY PRN
Qty: 60 TABLET | Refills: 2 | Status: SHIPPED | OUTPATIENT
Start: 2025-04-29

## 2025-04-30 ENCOUNTER — APPOINTMENT (OUTPATIENT)
Dept: PRIMARY CARE | Facility: CLINIC | Age: 55
End: 2025-04-30
Payer: COMMERCIAL

## 2025-05-05 ENCOUNTER — OFFICE VISIT (OUTPATIENT)
Dept: PRIMARY CARE | Facility: CLINIC | Age: 55
End: 2025-05-05
Payer: COMMERCIAL

## 2025-05-05 VITALS
OXYGEN SATURATION: 100 % | WEIGHT: 160.8 LBS | HEART RATE: 92 BPM | DIASTOLIC BLOOD PRESSURE: 85 MMHG | BODY MASS INDEX: 23.82 KG/M2 | SYSTOLIC BLOOD PRESSURE: 124 MMHG | HEIGHT: 69 IN | TEMPERATURE: 97.7 F | RESPIRATION RATE: 16 BRPM

## 2025-05-05 DIAGNOSIS — F41.9 ANXIETY DISORDER, UNSPECIFIED TYPE: ICD-10-CM

## 2025-05-05 DIAGNOSIS — E11.69 TYPE 2 DIABETES MELLITUS WITH OTHER SPECIFIED COMPLICATION, WITHOUT LONG-TERM CURRENT USE OF INSULIN: Primary | ICD-10-CM

## 2025-05-05 PROCEDURE — 99214 OFFICE O/P EST MOD 30 MIN: CPT | Performed by: INTERNAL MEDICINE

## 2025-05-05 PROCEDURE — 3079F DIAST BP 80-89 MM HG: CPT | Performed by: INTERNAL MEDICINE

## 2025-05-05 PROCEDURE — 36415 COLL VENOUS BLD VENIPUNCTURE: CPT | Performed by: INTERNAL MEDICINE

## 2025-05-05 PROCEDURE — 3008F BODY MASS INDEX DOCD: CPT | Performed by: INTERNAL MEDICINE

## 2025-05-05 PROCEDURE — 1036F TOBACCO NON-USER: CPT | Performed by: INTERNAL MEDICINE

## 2025-05-05 PROCEDURE — 4010F ACE/ARB THERAPY RXD/TAKEN: CPT | Performed by: INTERNAL MEDICINE

## 2025-05-05 PROCEDURE — 3060F POS MICROALBUMINURIA REV: CPT | Performed by: INTERNAL MEDICINE

## 2025-05-05 PROCEDURE — 3074F SYST BP LT 130 MM HG: CPT | Performed by: INTERNAL MEDICINE

## 2025-05-05 SDOH — ECONOMIC STABILITY: FOOD INSECURITY: WITHIN THE PAST 12 MONTHS, THE FOOD YOU BOUGHT JUST DIDN'T LAST AND YOU DIDN'T HAVE MONEY TO GET MORE.: NEVER TRUE

## 2025-05-05 SDOH — ECONOMIC STABILITY: FOOD INSECURITY: WITHIN THE PAST 12 MONTHS, YOU WORRIED THAT YOUR FOOD WOULD RUN OUT BEFORE YOU GOT MONEY TO BUY MORE.: NEVER TRUE

## 2025-05-05 ASSESSMENT — ENCOUNTER SYMPTOMS
ABDOMINAL PAIN: 0
DEPRESSION: 0
VOMITING: 0
OCCASIONAL FEELINGS OF UNSTEADINESS: 0
FEVER: 0
CHILLS: 0
NAUSEA: 0
SHORTNESS OF BREATH: 0
LOSS OF SENSATION IN FEET: 0

## 2025-05-05 ASSESSMENT — ANXIETY QUESTIONNAIRES
GAD7 TOTAL SCORE: 0
6. BECOMING EASILY ANNOYED OR IRRITABLE: NOT AT ALL
1. FEELING NERVOUS, ANXIOUS, OR ON EDGE: NOT AT ALL
7. FEELING AFRAID AS IF SOMETHING AWFUL MIGHT HAPPEN: NOT AT ALL
3. WORRYING TOO MUCH ABOUT DIFFERENT THINGS: NOT AT ALL
4. TROUBLE RELAXING: NOT AT ALL
2. NOT BEING ABLE TO STOP OR CONTROL WORRYING: NOT AT ALL
5. BEING SO RESTLESS THAT IT IS HARD TO SIT STILL: NOT AT ALL

## 2025-05-05 ASSESSMENT — PATIENT HEALTH QUESTIONNAIRE - PHQ9
2. FEELING DOWN, DEPRESSED OR HOPELESS: NOT AT ALL
1. LITTLE INTEREST OR PLEASURE IN DOING THINGS: NOT AT ALL
SUM OF ALL RESPONSES TO PHQ9 QUESTIONS 1 AND 2: 0

## 2025-05-05 ASSESSMENT — LIFESTYLE VARIABLES: HOW MANY STANDARD DRINKS CONTAINING ALCOHOL DO YOU HAVE ON A TYPICAL DAY: PATIENT DOES NOT DRINK

## 2025-05-05 ASSESSMENT — PAIN SCALES - GENERAL: PAINLEVEL_OUTOF10: 0-NO PAIN

## 2025-05-05 NOTE — PROGRESS NOTES
"Subjective   Patient ID: Aakash Chambers is a 54 y.o. male who presents for Follow-up.    Presents for follow up. Anxiety controlled on current dose of med. No acute issues. Working on keeping his weight down.          Review of Systems   Constitutional:  Negative for chills and fever.   Respiratory:  Negative for shortness of breath.    Cardiovascular:  Negative for chest pain.   Gastrointestinal:  Negative for abdominal pain, nausea and vomiting.       Objective   /85   Pulse 92   Temp 36.5 °C (97.7 °F) (Temporal)   Resp 16   Ht 1.753 m (5' 9\")   Wt 72.9 kg (160 lb 12.8 oz)   SpO2 100%   BMI 23.75 kg/m²     Physical Exam  Gen appearance: well groomed in NAD  A & O: alert and oriented x 3  CV: RRR   Lungs: CTA bilaterally  Extr: no edema   Assessment/Plan   Anxiety: cont med on current effective dose.  DM: check A1c  RTO 3 mos  CPE due in Nov.       "

## 2025-05-06 LAB
EST. AVERAGE GLUCOSE BLD GHB EST-MCNC: 123 MG/DL
EST. AVERAGE GLUCOSE BLD GHB EST-SCNC: 6.8 MMOL/L
HBA1C MFR BLD: 5.9 %

## 2025-05-27 DIAGNOSIS — F41.9 ANXIETY DISORDER, UNSPECIFIED TYPE: ICD-10-CM

## 2025-05-27 NOTE — PROGRESS NOTES
"Last visit 4/22/25:  -will consider inflatable penile prosthesis  - educational info given  -penoscrotal  -Coloplast  -vanc/zosyn  -space retzius for reservoir  -will check insurance benefits  #BPH  -continue Cialis 5mg daily  Fuv in 1 month for repeat implant consult    Today's visit:    #Erectile Dysfunction   -is back on Cialis 20mg and is adequate and tolerable, happier with this rather than injections  -he is able to have an erection and maintain it for penetration, but not fully happy  -would like to discuss implants  -mix 5 --> tried 4 times at 10 units, didn't work well enough, no side effects  -doppler 1/2024 - 50% with 20 of 5    s/p prostatectomy: no  On nitrates/NTG?: No  Libido/Desire: Good  Have been on Testosterone /anabolic steroids? No    #LUTS  -denies hematuria and dysuria   -PVR 270ml today  -on Cialis 5mg daily      Labs  Lab Results   Component Value Date    TESTOSTERONE 641 01/12/2024     Lab Results   Component Value Date    LH 5.5 01/12/2024     Lab Results   Component Value Date    FSH 2.5 01/12/2024     No components found for: \"ESTRADIAL\"  Lab Results   Component Value Date    PSA 0.42 03/02/2022     No components found for: \"CBC\"  Lab Results   Component Value Date    PROLACTIN 5.4 01/12/2024     Lab Results   Component Value Date    HGBA1C 5.9 (H) 05/05/2025     Lab Results   Component Value Date    HCT 42.0 10/30/2024         PMH:  Past Medical History:   Diagnosis Date    Alcohol-induced acute pancreatitis without infection or necrosis ([facility]) 10/22/2016    Pancreatitis ([facility]) 09/05/2023        PSH:  No past surgical history on file.     Medications:    Current Outpatient Medications:     ALPRAZolam (Xanax) 0.5 mg tablet, Take 1 tablet by mouth twice daily, Disp: 60 tablet, Rfl: 2    ammonium lactate (Amlactin) 12 % cream, Apply topically 2 times a day. to affected area, Disp: , Rfl:     aspirin 81 mg EC tablet, Take 1 tablet (81 mg) by mouth once daily., Disp: , Rfl:     " atorvastatin (Lipitor) 40 mg tablet, Take 1 tablet by mouth once daily, Disp: 90 tablet, Rfl: 1    chlorthalidone (Hygroton) 25 mg tablet, Take 1 tablet by mouth once daily, Disp: 90 tablet, Rfl: 1    cholecalciferol (Vitamin D-3) 25 MCG (1000 UT) tablet, Take 1 tablet (1,000 Units) by mouth once daily., Disp: 90 tablet, Rfl: 3    cyclobenzaprine (Flexeril) 10 mg tablet, TAKE 1 TABLET BY MOUTH AS NEEDED AT BEDTIME FOR MUSCLE SPASM, Disp: 90 tablet, Rfl: 1    ibuprofen 800 mg tablet, Take 1 tablet (800 mg) by mouth 3 times a day as needed (WITH FOOD)., Disp: 90 tablet, Rfl: 3    indomethacin (Indocin) 50 mg capsule, Take 1 capsule (50 mg) by mouth 2 times a day., Disp: , Rfl:     lisinopril 40 mg tablet, Take 1 tablet by mouth once daily, Disp: 90 tablet, Rfl: 1    metFORMIN (Glucophage) 500 mg tablet, Take 1 tablet by mouth once daily, Disp: 90 tablet, Rfl: 1    multivitamin tablet, Take 1 tablet by mouth once daily., Disp: , Rfl:     naloxone (Narcan) 4 mg/0.1 mL nasal spray, USE AS DIRECTED, Disp: , Rfl:     omeprazole (PriLOSEC) 40 mg DR capsule, TAKE 1 CAPSULE BY MOUTH ONCE DAILY FOR HEARTBURN, Disp: 90 capsule, Rfl: 1    tadalafil (Cialis) 20 mg tablet, Take 1 tablet (20 mg) by mouth if needed for erectile dysfunction (Start with half tab. Take 2 hours prior to wanting erection.If you get an erection over 4 hours, go to the emergency room.)., Disp: 30 tablet, Rfl: 6    tadalafil (Cialis) 5 mg tablet, Take 1 tablet (5 mg) by mouth once daily., Disp: 30 tablet, Rfl: 11    traMADol (Ultram) 50 mg tablet, Take 1 tablet (50 mg) by mouth 2 times a day as needed for moderate pain (4 - 6)., Disp: 60 tablet, Rfl: 2    Allergy:  No Known Allergies     Exam  Testicles descended bilaterally, nontender, no masses  Vasa palpable bilaterally  Penis circ'd, no lesions, no plaques          Assessment/Plan  Severe erectile dysfunction: secondary to arterial insufficiency and corporo veno-occlusive dysfunction refractory to  medical management with PDE's (Viagra, Cialis) and intracavernosal therapy .  We had a long discussion regarding penile prosthesis, including risks, benefits, and alternatives. We discussed risks including but not limited to pain, bleeding, infection, damage to adjacent structures, need for further procedures, malfunction, erosion, extrusion, complications of anesthesia etc. We discussed the postoperative course and expectations, and specifically that after getting an implant, spontaneous erections do not occur other methods such as injections etc are no longer effective. We also discussed the effect of the implant on length and that it will likely be shorter than previous given age and duration of ED. Further, if he gets an infection or the implant has to be removed for any reason, there is potential for him to never have erections again. All questions were answered and reading materials were given. The patient was given models of both the inflatable  implants, and his ability to squeeze the pump was also assessed.     -will proceed with inflatable penile prosthesis  - educational info given  -penoscrotal  -Coloplast  -vanc/zosyn  -space retzius for reservoir  -has insurance coverage     #BPH/LUTS  -retention, pvr high today.  -will re-check PVR  -discussed flomax 0.4mg, r/b/a's discussed        G [id number]  Visit complexity inherent to evaluation and management (E&M) associated with medical care services that serve as the continuing focal point for all needed health care services and/or with medical care services that are part of ongoing care related to a patient's single, serious condition or a complex condition.    Scribe Attestation  By signing my name below, ICarly Scribe   attest that this documentation has been prepared under the direction and in the presence of Westley Reese MD.

## 2025-05-28 ENCOUNTER — APPOINTMENT (OUTPATIENT)
Dept: UROLOGY | Facility: CLINIC | Age: 55
End: 2025-05-28
Payer: COMMERCIAL

## 2025-05-28 VITALS — HEIGHT: 70 IN | TEMPERATURE: 96 F | BODY MASS INDEX: 23.07 KG/M2

## 2025-05-28 DIAGNOSIS — N52.9 ERECTILE DYSFUNCTION, UNSPECIFIED ERECTILE DYSFUNCTION TYPE: Primary | ICD-10-CM

## 2025-05-28 PROCEDURE — 99215 OFFICE O/P EST HI 40 MIN: CPT | Performed by: UROLOGY

## 2025-05-28 PROCEDURE — 3060F POS MICROALBUMINURIA REV: CPT | Performed by: UROLOGY

## 2025-05-28 PROCEDURE — 4010F ACE/ARB THERAPY RXD/TAKEN: CPT | Performed by: UROLOGY

## 2025-05-28 PROCEDURE — 1036F TOBACCO NON-USER: CPT | Performed by: UROLOGY

## 2025-05-28 RX ORDER — VANCOMYCIN HYDROCHLORIDE 1 G/200ML
1000 INJECTION, SOLUTION INTRAVENOUS ONCE
OUTPATIENT
Start: 2025-05-28 | End: 2025-05-28

## 2025-05-28 RX ORDER — ACETAMINOPHEN 325 MG/1
975 TABLET ORAL ONCE
OUTPATIENT
Start: 2025-05-28 | End: 2025-05-28

## 2025-05-28 RX ORDER — ALPRAZOLAM 0.5 MG/1
0.5 TABLET ORAL 2 TIMES DAILY
Qty: 60 TABLET | Refills: 2 | Status: SHIPPED | OUTPATIENT
Start: 2025-05-28

## 2025-05-28 RX ORDER — CELECOXIB 400 MG/1
400 CAPSULE ORAL ONCE
OUTPATIENT
Start: 2025-05-28 | End: 2025-05-28

## 2025-05-28 RX ORDER — CHLORHEXIDINE GLUCONATE 40 MG/ML
SOLUTION TOPICAL DAILY PRN
OUTPATIENT
Start: 2025-05-28

## 2025-05-28 RX ORDER — GABAPENTIN 300 MG/1
600 CAPSULE ORAL ONCE
OUTPATIENT
Start: 2025-05-28 | End: 2025-05-28

## 2025-05-28 ASSESSMENT — PAIN SCALES - GENERAL: PAINLEVEL_OUTOF10: 0-NO PAIN

## 2025-05-30 LAB — BACTERIA UR CULT: NORMAL

## 2025-06-23 DIAGNOSIS — R79.89 LOW VITAMIN D LEVEL: ICD-10-CM

## 2025-06-23 RX ORDER — CHOLECALCIFEROL (VITAMIN D3) 25 MCG
25 TABLET ORAL DAILY
Qty: 90 TABLET | Refills: 1 | Status: SHIPPED | OUTPATIENT
Start: 2025-06-23

## 2025-06-26 DIAGNOSIS — E11.9 TYPE 2 DIABETES MELLITUS WITHOUT COMPLICATION, WITHOUT LONG-TERM CURRENT USE OF INSULIN: ICD-10-CM

## 2025-06-26 RX ORDER — METFORMIN HYDROCHLORIDE 500 MG/1
500 TABLET ORAL DAILY
Qty: 90 TABLET | Refills: 1 | Status: SHIPPED | OUTPATIENT
Start: 2025-06-26

## 2025-07-03 ENCOUNTER — APPOINTMENT (OUTPATIENT)
Dept: UROLOGY | Facility: CLINIC | Age: 55
End: 2025-07-03
Payer: COMMERCIAL

## 2025-07-03 VITALS — TEMPERATURE: 96.7 F

## 2025-07-03 DIAGNOSIS — N52.9 ERECTILE DYSFUNCTION, UNSPECIFIED ERECTILE DYSFUNCTION TYPE: Primary | ICD-10-CM

## 2025-07-03 LAB
POC APPEARANCE, URINE: CLEAR
POC BILIRUBIN, URINE: NEGATIVE
POC BLOOD, URINE: NEGATIVE
POC COLOR, URINE: YELLOW
POC GLUCOSE, URINE: NEGATIVE MG/DL
POC KETONES, URINE: NEGATIVE MG/DL
POC LEUKOCYTES, URINE: NEGATIVE
POC NITRITE,URINE: NEGATIVE
POC PH, URINE: 7 PH
POC PROTEIN, URINE: NEGATIVE MG/DL
POC SPECIFIC GRAVITY, URINE: 1.01
POC UROBILINOGEN, URINE: 0.2 EU/DL

## 2025-07-03 PROCEDURE — 3060F POS MICROALBUMINURIA REV: CPT | Performed by: NURSE PRACTITIONER

## 2025-07-03 PROCEDURE — 1036F TOBACCO NON-USER: CPT | Performed by: NURSE PRACTITIONER

## 2025-07-03 PROCEDURE — 4010F ACE/ARB THERAPY RXD/TAKEN: CPT | Performed by: NURSE PRACTITIONER

## 2025-07-03 PROCEDURE — 99213 OFFICE O/P EST LOW 20 MIN: CPT | Performed by: NURSE PRACTITIONER

## 2025-07-03 PROCEDURE — 81003 URINALYSIS AUTO W/O SCOPE: CPT | Performed by: NURSE PRACTITIONER

## 2025-07-03 RX ORDER — SULFAMETHOXAZOLE AND TRIMETHOPRIM 800; 160 MG/1; MG/1
1 TABLET ORAL 2 TIMES DAILY
Qty: 4 TABLET | Refills: 0 | Status: SHIPPED | OUTPATIENT
Start: 2025-07-03 | End: 2025-07-05

## 2025-07-03 ASSESSMENT — PAIN SCALES - GENERAL: PAINLEVEL_OUTOF10: 0-NO PAIN

## 2025-07-03 NOTE — PROGRESS NOTES
[facility]  Outpatient Clinic Note    Subjective   Aakash Chambers is a 55 y.o. male    History of Present Illness   Patient presenting to clinic today for preoperative education. Pending IPP with   Dr. Reese on 7/17/2025  History of ED refractory to pde5i     Past Medical History and Surgical History   Medical History[1]  Surgical History[2]    Medications  Medications Ordered Prior to Encounter[3]    Objective   Physicial Exam  General: Well developed, well nourished, alert and cooperative, appears in no acute distress  Eyes: Non-injected conjunctiva, sclera clear, no proptosis  Cardiac: Extremities are warm and well perfused. No edema, cyanosis or pallor.   Lungs: Breathing is easy, non-labored. Speaking in clear and complete sentences. Normal diaphragmatic movement.  MSK: Ambulatory with steady gait, unassisted  Neuro: alert and oriented to person, place and time  Psych: Demonstrates good judgement and reason, without hallucinations, abnormal affect or abnormal behaviors.  Skin: no obvious lesions, no rashes.    No visits with results within 30 Day(s) from this visit.   Latest known visit with results is:   Office Visit on 05/28/2025   Component Date Value Ref Range Status    CULTURE, URINE, ROUTINE 05/28/2025 SEE NOTE   Final    Comment:     CULTURE, URINE, ROUTINE         Micro Number:      [id number]    Test Status:       Final    Specimen Source:   Clean catch/voided    Specimen Quality:  Adequate    Result:            Less than 10,000 CFU/mL of single Gram positive                       organism isolated. No further testing will be                       performed. If clinically indicated, recollection                       using a method to minimize contamination, with                       prompt transfer to Urine Culture Transport Tube,                       is recommended.        Review of Systems  All other systems have been reviewed and are negative for complaint.      Assessment and  Plan   We reviewed all pertinent laboratory work and imaging as it pertains to patient's upcoming surgery.  We discussed the risks, benefits and alternatives to patient's upcoming surgery.  We discussed the post operative course including incision care, drain care, and catheter care. We had an opportunity to review IPP including pump, reservoir and cylinders. We reviewed use and indication of preoperative antibiotic, of which he verbalized understanding. All questions were addressed and answered appropriately.  Patient feels comfortable following this teaching visit.    - Patient will start Bactrim twice daily, two days prior to surgery. Sent to pharmacy today     All questions and concerns were addressed. Patient verbalizes understanding and has no other questions at this time.     Sonia Abreu-- DIPAK GILMORE  Office Phone:  [phone]             [1]   Past Medical History:  Diagnosis Date    Alcohol-induced acute pancreatitis without infection or necrosis ([facility]) 10/22/2016    Pancreatitis ([facility]) 09/05/2023   [2] No past surgical history on file.  [3]   Current Outpatient Medications on File Prior to Visit   Medication Sig Dispense Refill    ALPRAZolam (Xanax) 0.5 mg tablet Take 1 tablet by mouth twice daily 60 tablet 2    ammonium lactate (Amlactin) 12 % cream Apply topically 2 times a day. to affected area      aspirin 81 mg EC tablet Take 1 tablet (81 mg) by mouth once daily.      atorvastatin (Lipitor) 40 mg tablet Take 1 tablet by mouth once daily 90 tablet 1    chlorthalidone (Hygroton) 25 mg tablet Take 1 tablet by mouth once daily 90 tablet 1    cholecalciferol (Vitamin D-3) 25 mcg (1,000 units) tablet Take 1 tablet by mouth once daily 90 tablet 1    cyclobenzaprine (Flexeril) 10 mg tablet TAKE 1 TABLET BY MOUTH AS NEEDED AT BEDTIME FOR MUSCLE SPASM 90 tablet 1    ibuprofen 800 mg tablet Take 1 tablet (800 mg) by mouth 3 times a day as needed (WITH FOOD). 90 tablet 3    indomethacin (Indocin) 50 mg  capsule Take 1 capsule (50 mg) by mouth 2 times a day.      lisinopril 40 mg tablet Take 1 tablet by mouth once daily 90 tablet 1    metFORMIN (Glucophage) 500 mg tablet Take 1 tablet by mouth once daily 90 tablet 1    multivitamin tablet Take 1 tablet by mouth once daily.      naloxone (Narcan) 4 mg/0.1 mL nasal spray USE AS DIRECTED      omeprazole (PriLOSEC) 40 mg DR capsule TAKE 1 CAPSULE BY MOUTH ONCE DAILY FOR HEARTBURN 90 capsule 1    tadalafil (Cialis) 20 mg tablet Take 1 tablet (20 mg) by mouth if needed for erectile dysfunction (Start with half tab. Take 2 hours prior to wanting erection.If you get an erection over 4 hours, go to the emergency room.). 30 tablet 6    tadalafil (Cialis) 5 mg tablet Take 1 tablet (5 mg) by mouth once daily. 30 tablet 11    traMADol (Ultram) 50 mg tablet Take 1 tablet (50 mg) by mouth 2 times a day as needed for moderate pain (4 - 6). 60 tablet 2    [DISCONTINUED] metFORMIN (Glucophage) 500 mg tablet Take 1 tablet by mouth once daily 90 tablet 1     No current facility-administered medications on file prior to visit.

## 2025-07-03 NOTE — H&P (VIEW-ONLY)
[facility] [place]  Outpatient Clinic Note    Subjective   Aakash Chambers is a 55 y.o. male    History of Present Illness   Patient presenting to clinic today for preoperative education. Pending IPP with   Dr. Reese on 7/17/2025  History of ED refractory to pde5i     Past Medical History and Surgical History   Medical History[1]  Surgical History[2]    Medications  Medications Ordered Prior to Encounter[3]    Objective   Physicial Exam  General: Well developed, well nourished, alert and cooperative, appears in no acute distress  Eyes: Non-injected conjunctiva, sclera clear, no proptosis  Cardiac: Extremities are warm and well perfused. No edema, cyanosis or pallor.   Lungs: Breathing is easy, non-labored. Speaking in clear and complete sentences. Normal diaphragmatic movement.  MSK: Ambulatory with steady gait, unassisted  Neuro: alert and oriented to person, place and time  Psych: Demonstrates good judgement and reason, without hallucinations, abnormal affect or abnormal behaviors.  Skin: no obvious lesions, no rashes.    No visits with results within 30 Day(s) from this visit.   Latest known visit with results is:   Office Visit on 05/28/2025   Component Date Value Ref Range Status    CULTURE, URINE, ROUTINE 05/28/2025 SEE NOTE   Final    Comment:     CULTURE, URINE, ROUTINE         Micro Number:      [id number]    Test Status:       Final    Specimen Source:   Clean catch/voided    Specimen Quality:  Adequate    Result:            Less than 10,000 CFU/mL of single Gram positive                       organism isolated. No further testing will be                       performed. If clinically indicated, recollection                       using a method to minimize contamination, with                       prompt transfer to Urine Culture Transport Tube,                       is recommended.        Review of Systems  All other systems have been reviewed and are negative for complaint.      Assessment and  Plan   We reviewed all pertinent laboratory work and imaging as it pertains to patient's upcoming surgery.  We discussed the risks, benefits and alternatives to patient's upcoming surgery.  We discussed the post operative course including incision care, drain care, and catheter care. We had an opportunity to review IPP including pump, reservoir and cylinders. We reviewed use and indication of preoperative antibiotic, of which he verbalized understanding. All questions were addressed and answered appropriately.  Patient feels comfortable following this teaching visit.    - Patient will start Bactrim twice daily, two days prior to surgery. Sent to pharmacy today     All questions and concerns were addressed. Patient verbalizes understanding and has no other questions at this time.     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  [3]   Current Outpatient Medications on File Prior to Visit   Medication Sig Dispense Refill    ALPRAZolam (Xanax) 0.5 mg tablet Take 1 tablet by mouth twice daily 60 tablet 2    ammonium lactate (Amlactin) 12 % cream Apply topically 2 times a day. to affected area      aspirin 81 mg EC tablet Take 1 tablet (81 mg) by mouth once daily.      atorvastatin (Lipitor) 40 mg tablet Take 1 tablet by mouth once daily 90 tablet 1    chlorthalidone (Hygroton) 25 mg tablet Take 1 tablet by mouth once daily 90 tablet 1    cholecalciferol (Vitamin D-3) 25 mcg (1,000 units) tablet Take 1 tablet by mouth once daily 90 tablet 1    cyclobenzaprine (Flexeril) 10 mg tablet TAKE 1 TABLET BY MOUTH AS NEEDED AT BEDTIME FOR MUSCLE SPASM 90 tablet 1    ibuprofen 800 mg tablet Take 1 tablet (800 mg) by mouth 3 times a day as needed (WITH FOOD). 90 tablet 3    indomethacin (Indocin) 50 mg  capsule Take 1 capsule (50 mg) by mouth 2 times a day.      lisinopril 40 mg tablet Take 1 tablet by mouth once daily 90 tablet 1    metFORMIN (Glucophage) 500 mg tablet Take 1 tablet by mouth once daily 90 tablet 1    multivitamin tablet Take 1 tablet by mouth once daily.      naloxone (Narcan) 4 mg/0.1 mL nasal spray USE AS DIRECTED      omeprazole (PriLOSEC) 40 mg DR capsule TAKE 1 CAPSULE BY MOUTH ONCE DAILY FOR HEARTBURN 90 capsule 1    tadalafil (Cialis) 20 mg tablet Take 1 tablet (20 mg) by mouth if needed for erectile dysfunction (Start with half tab. Take 2 hours prior to wanting erection.If you get an erection over 4 hours, go to the emergency room.). 30 tablet 6    tadalafil (Cialis) 5 mg tablet Take 1 tablet (5 mg) by mouth once daily. 30 tablet 11    traMADol (Ultram) 50 mg tablet Take 1 tablet (50 mg) by mouth 2 times a day as needed for moderate pain (4 - 6). 60 tablet 2    [DISCONTINUED] metFORMIN (Glucophage) 500 mg tablet Take 1 tablet by mouth once daily 90 tablet 1     No current facility-administered medications on file prior to visit.

## 2025-07-09 ENCOUNTER — TELEPHONE (OUTPATIENT)
Dept: UROLOGY | Facility: CLINIC | Age: 55
End: 2025-07-09
Payer: COMMERCIAL

## 2025-07-09 DIAGNOSIS — Z01.818 PREOP TESTING: ICD-10-CM

## 2025-07-09 RX ORDER — CHLORHEXIDINE GLUCONATE 40 MG/ML
SOLUTION TOPICAL ONCE
Qty: 236 ML | Refills: 0 | Status: SHIPPED | OUTPATIENT
Start: 2025-07-09 | End: 2025-07-09

## 2025-07-09 NOTE — TELEPHONE ENCOUNTER
Received email from PAT pt unable to make PAT appt prior to surgery with Dr. Reese advises patient to do labs and use CHG soap the night before surgery. Called patient and notified of new lab orders and soap being sent to pharmacy, patient voices understanding. Will follow up for surgery with Dr. Reese.

## 2025-07-11 ENCOUNTER — APPOINTMENT (OUTPATIENT)
Dept: PREADMISSION TESTING | Facility: HOSPITAL | Age: 55
End: 2025-07-11
Payer: COMMERCIAL

## 2025-07-12 LAB
ANION GAP SERPL CALCULATED.4IONS-SCNC: 12 MMOL/L (CALC) (ref 7–17)
APTT PPP: 29 SEC (ref 23–32)
BASOPHILS # BLD AUTO: 43 CELLS/UL (ref 0–200)
BASOPHILS NFR BLD AUTO: 0.7 %
BUN SERPL-MCNC: 10 MG/DL (ref 7–25)
BUN/CREAT SERPL: ABNORMAL (CALC) (ref 6–22)
CALCIUM SERPL-MCNC: 9.4 MG/DL (ref 8.6–10.3)
CHLORIDE SERPL-SCNC: 90 MMOL/L (ref 98–110)
CO2 SERPL-SCNC: 27 MMOL/L (ref 20–32)
CREAT SERPL-MCNC: 0.84 MG/DL (ref 0.7–1.3)
EGFRCR SERPLBLD CKD-EPI 2021: 102 ML/MIN/1.73M2
EOSINOPHIL # BLD AUTO: 122 CELLS/UL (ref 15–500)
EOSINOPHIL NFR BLD AUTO: 2 %
ERYTHROCYTE [DISTWIDTH] IN BLOOD BY AUTOMATED COUNT: 13.1 % (ref 11–15)
EST. AVERAGE GLUCOSE BLD GHB EST-MCNC: 128 MG/DL
EST. AVERAGE GLUCOSE BLD GHB EST-SCNC: 7.1 MMOL/L
GLUCOSE SERPL-MCNC: 95 MG/DL (ref 65–99)
HBA1C MFR BLD: 6.1 %
HCT VFR BLD AUTO: 42.5 % (ref 38.5–50)
HGB BLD-MCNC: 14.7 G/DL (ref 13.2–17.1)
INR PPP: 1
LYMPHOCYTES # BLD AUTO: 1190 CELLS/UL (ref 850–3900)
LYMPHOCYTES NFR BLD AUTO: 19.5 %
MCH RBC QN AUTO: 33.1 PG (ref 27–33)
MCHC RBC AUTO-ENTMCNC: 34.6 G/DL (ref 32–36)
MCV RBC AUTO: 95.7 FL (ref 80–100)
MONOCYTES # BLD AUTO: 586 CELLS/UL (ref 200–950)
MONOCYTES NFR BLD AUTO: 9.6 %
NEUTROPHILS # BLD AUTO: 4160 CELLS/UL (ref 1500–7800)
NEUTROPHILS NFR BLD AUTO: 68.2 %
PLATELET # BLD AUTO: 310 THOUSAND/UL (ref 140–400)
PMV BLD REES-ECKER: 8.3 FL (ref 7.5–12.5)
POTASSIUM SERPL-SCNC: 3.7 MMOL/L (ref 3.5–5.3)
PROTHROMBIN TIME: 10.4 SEC (ref 9–11.5)
RBC # BLD AUTO: 4.44 MILLION/UL (ref 4.2–5.8)
SODIUM SERPL-SCNC: 129 MMOL/L (ref 135–146)
WBC # BLD AUTO: 6.1 THOUSAND/UL (ref 3.8–10.8)

## 2025-07-16 ENCOUNTER — ANESTHESIA EVENT (OUTPATIENT)
Dept: OPERATING ROOM | Facility: HOSPITAL | Age: 55
End: 2025-07-16
Payer: COMMERCIAL

## 2025-07-16 NOTE — ANESTHESIA PREPROCEDURE EVALUATION
Patient: Aakash Chambers    Procedure Information       Date/Time: 07/17/25 0915    Procedure: Penile Prosthesis Insertion    Location: [facility] A OR 04 / Virtual [facility] A OR    Surgeons: Westley Reese MD            Relevant Problems   Cardiac   (+) Essential hypertension   (+) High cholesterol      Neuro   (+) Anxiety disorder   (+) Opioid dependence, continuous (Multi)      /Renal   (+) BPH with obstruction/lower urinary tract symptoms   (+) Hyponatremia       Clinical information reviewed:   Tobacco  Allergies  Meds   Med Hx  Surg Hx   Fam Hx  Soc Hx         Medical History[1]   Surgical History[2]  Social History[3]   Current Outpatient Medications   Medication Instructions    ALPRAZolam (XANAX) 0.5 mg, oral, 2 times daily    ammonium lactate (Amlactin) 12 % cream 2 times daily    aspirin 81 mg EC tablet 1 tablet, Daily    atorvastatin (LIPITOR) 40 mg, oral, Daily    chlorthalidone (HYGROTON) 25 mg, oral, Daily    cholecalciferol (VITAMIN D-3) 25 mcg, oral, Daily    cyclobenzaprine (FLEXERIL) 10 mg, oral, Nightly PRN    ibuprofen 800 mg, oral, 3 times daily PRN    indomethacin (Indocin) 50 mg capsule 1 capsule, 2 times daily    lisinopril 40 mg, oral, Daily    metFORMIN (GLUCOPHAGE) 500 mg, oral, Daily    multivitamin tablet 1 tablet, Daily RT    naloxone (Narcan) 4 mg/0.1 mL nasal spray USE AS DIRECTED    omeprazole (PriLOSEC) 40 mg DR capsule TAKE 1 CAPSULE BY MOUTH ONCE DAILY FOR HEARTBURN    tadalafil (CIALIS) 5 mg, oral, Daily    tadalafil 20 mg, oral, As needed    traMADol (ULTRAM) 50 mg, oral, 2 times daily PRN      RX Allergies[4]     Chemistry    Lab Results   Component Value Date/Time     (L) 07/11/2025 0928    K 3.7 07/11/2025 0928    CL 90 (L) 07/11/2025 0928    CO2 27 07/11/2025 0928    BUN 10 07/11/2025 0928    CREATININE 0.84 07/11/2025 0928    Lab Results   Component Value Date/Time    CALCIUM 9.4 07/11/2025 0928    ALKPHOS 87 10/30/2024 1014    AST 32 10/30/2024 1014    ALT 33  "10/30/2024 1014    BILITOT 0.6 10/30/2024 1014          Lab Results   Component Value Date    HGBA1C 6.1 (H) 07/11/2025     Lab Results   Component Value Date/Time    WBC 6.1 07/11/2025 0928    HGB 14.7 07/11/2025 0928    HCT 42.5 07/11/2025 0928     07/11/2025 0928     Lab Results   Component Value Date/Time    PROTIME 10.4 07/11/2025 0928    INR 1.0 07/11/2025 0928     No results found for this or any previous visit (from the past 4464 hours).   No results found for this or any previous visit from the past 1095 days.        Visit Vitals  /82 (BP Location: Right arm)   Pulse 107   Temp 36.5 °C (97.7 °F) (Temporal)   Resp 20   Ht 1.778 m (5' 10\")   Wt 68.6 kg (151 lb 3.8 oz)   SpO2 99%   BMI 21.70 kg/m²   Smoking Status Never   BSA 1.84 m²     NPO/Void Status  Carbohydrate Drink Given Prior to Surgery? : N  Date of Last Liquid: 07/17/25  Time of Last Liquid: 0600  Date of Last Solid: 07/16/25  Time of Last Solid: 2200  Last Intake Type: Clear fluids  Time of Last Void: 0600        Physical Exam    Airway  Mallampati: III  TM distance: >3 FB  Neck ROM: full  Mouth opening: 3 or more finger widths     Cardiovascular - normal exam  Rhythm: regular  Rate: normal     Dental    Pulmonary - normal exam   Abdominal - normal exam           Anesthesia Plan    History of general anesthesia?: yes  History of complications of general anesthesia?: no    ASA 3     general   (General with LMA)  intravenous induction   Postoperative pain plan includes opioids.  Trial extubation is planned.  Anesthetic plan and risks discussed with patient.    Plan discussed with CRNA and CAA.             [1]   Past Medical History:  Diagnosis Date    Alcohol-induced acute pancreatitis without infection or necrosis ([facility]) 10/22/2016    Anxiety 2002    Arthritis     Diabetes mellitus (Multi)     Erectile dysfunction     GERD (gastroesophageal reflux disease)     Hypertension 07/05/2000    Yes    Joint pain     Pancreatitis ([facility]) " 09/05/2023    Scoliosis    [2]   Past Surgical History:  Procedure Laterality Date    COLONOSCOPY      OTHER SURGICAL HISTORY      Left hip replacement    TOTAL HIP ARTHROPLASTY Left 09/14/2016   [3]   Social History  Tobacco Use    Smoking status: Never    Smokeless tobacco: Never   Substance Use Topics    Alcohol use: Not Currently     Alcohol/week: 8.0 standard drinks of alcohol     Types: 8 Cans of beer per week    Drug use: Not Currently   [4] No Known Allergies

## 2025-07-17 ENCOUNTER — ANESTHESIA (OUTPATIENT)
Dept: OPERATING ROOM | Facility: HOSPITAL | Age: 55
End: 2025-07-17
Payer: COMMERCIAL

## 2025-07-17 ENCOUNTER — HOSPITAL ENCOUNTER (OUTPATIENT)
Facility: HOSPITAL | Age: 55
Setting detail: OUTPATIENT SURGERY
Discharge: HOME | End: 2025-07-17
Attending: UROLOGY | Admitting: UROLOGY
Payer: COMMERCIAL

## 2025-07-17 VITALS
HEART RATE: 88 BPM | OXYGEN SATURATION: 99 % | TEMPERATURE: 97.7 F | HEIGHT: 70 IN | BODY MASS INDEX: 21.65 KG/M2 | DIASTOLIC BLOOD PRESSURE: 86 MMHG | SYSTOLIC BLOOD PRESSURE: 122 MMHG | RESPIRATION RATE: 20 BRPM | WEIGHT: 151.24 LBS

## 2025-07-17 DIAGNOSIS — N52.9 ERECTILE DYSFUNCTION, UNSPECIFIED ERECTILE DYSFUNCTION TYPE: Primary | ICD-10-CM

## 2025-07-17 LAB
GLUCOSE BLD MANUAL STRIP-MCNC: 109 MG/DL (ref 74–99)
GLUCOSE BLD MANUAL STRIP-MCNC: 50 MG/DL (ref 74–99)
GLUCOSE BLD MANUAL STRIP-MCNC: 91 MG/DL (ref 74–99)

## 2025-07-17 PROCEDURE — 2500000001 HC RX 250 WO HCPCS SELF ADMINISTERED DRUGS (ALT 637 FOR MEDICARE OP): Performed by: ANESTHESIOLOGY

## 2025-07-17 PROCEDURE — 7100000009 HC PHASE TWO TIME - INITIAL BASE CHARGE: Performed by: UROLOGY

## 2025-07-17 PROCEDURE — 3600000003 HC OR TIME - INITIAL BASE CHARGE - PROCEDURE LEVEL THREE: Performed by: UROLOGY

## 2025-07-17 PROCEDURE — 2500000004 HC RX 250 GENERAL PHARMACY W/ HCPCS (ALT 636 FOR OP/ED): Performed by: UROLOGY

## 2025-07-17 PROCEDURE — 3600000008 HC OR TIME - EACH INCREMENTAL 1 MINUTE - PROCEDURE LEVEL THREE: Performed by: UROLOGY

## 2025-07-17 PROCEDURE — 2780000003 HC OR 278 NO HCPCS: Performed by: UROLOGY

## 2025-07-17 PROCEDURE — 3700000001 HC GENERAL ANESTHESIA TIME - INITIAL BASE CHARGE: Performed by: UROLOGY

## 2025-07-17 PROCEDURE — 7100000002 HC RECOVERY ROOM TIME - EACH INCREMENTAL 1 MINUTE: Performed by: UROLOGY

## 2025-07-17 PROCEDURE — C1813 PROSTHESIS, PENILE, INFLATAB: HCPCS | Performed by: UROLOGY

## 2025-07-17 PROCEDURE — 2500000004 HC RX 250 GENERAL PHARMACY W/ HCPCS (ALT 636 FOR OP/ED): Performed by: STUDENT IN AN ORGANIZED HEALTH CARE EDUCATION/TRAINING PROGRAM

## 2025-07-17 PROCEDURE — 54405 INSERT MULTI-COMP PENIS PROS: CPT | Performed by: UROLOGY

## 2025-07-17 PROCEDURE — 54235 NJX CORPORA CAVERNOSA RX AGT: CPT | Performed by: UROLOGY

## 2025-07-17 PROCEDURE — 7100000001 HC RECOVERY ROOM TIME - INITIAL BASE CHARGE: Performed by: UROLOGY

## 2025-07-17 PROCEDURE — 2500000004 HC RX 250 GENERAL PHARMACY W/ HCPCS (ALT 636 FOR OP/ED): Performed by: ANESTHESIOLOGIST ASSISTANT

## 2025-07-17 PROCEDURE — 2720000007 HC OR 272 NO HCPCS: Performed by: UROLOGY

## 2025-07-17 PROCEDURE — 3700000002 HC GENERAL ANESTHESIA TIME - EACH INCREMENTAL 1 MINUTE: Performed by: UROLOGY

## 2025-07-17 PROCEDURE — 2500000001 HC RX 250 WO HCPCS SELF ADMINISTERED DRUGS (ALT 637 FOR MEDICARE OP): Performed by: UROLOGY

## 2025-07-17 PROCEDURE — 14040 TIS TRNFR F/C/C/M/N/A/G/H/F: CPT | Performed by: UROLOGY

## 2025-07-17 PROCEDURE — 7100000010 HC PHASE TWO TIME - EACH INCREMENTAL 1 MINUTE: Performed by: UROLOGY

## 2025-07-17 PROCEDURE — 82947 ASSAY GLUCOSE BLOOD QUANT: CPT

## 2025-07-17 DEVICE — CL RESERVOIR
Type: IMPLANTABLE DEVICE | Site: PENIS | Status: FUNCTIONAL
Brand: TITAN

## 2025-07-17 DEVICE — IMPLANTABLE DEVICE: Type: IMPLANTABLE DEVICE | Site: PENIS | Status: FUNCTIONAL

## 2025-07-17 DEVICE — SCROTAL ZERO DEGREE ANGLE CYLINDER SET WITH PUMP
Type: IMPLANTABLE DEVICE | Site: PENIS | Status: FUNCTIONAL
Brand: TITAN

## 2025-07-17 RX ORDER — DROPERIDOL 2.5 MG/ML
0.62 INJECTION, SOLUTION INTRAMUSCULAR; INTRAVENOUS ONCE AS NEEDED
Status: DISCONTINUED | OUTPATIENT
Start: 2025-07-17 | End: 2025-07-17 | Stop reason: HOSPADM

## 2025-07-17 RX ORDER — OXYCODONE HYDROCHLORIDE 5 MG/1
5 TABLET ORAL EVERY 4 HOURS PRN
Status: DISCONTINUED | OUTPATIENT
Start: 2025-07-17 | End: 2025-07-17 | Stop reason: HOSPADM

## 2025-07-17 RX ORDER — ACETAMINOPHEN 325 MG/1
650 TABLET ORAL EVERY 4 HOURS PRN
Status: DISCONTINUED | OUTPATIENT
Start: 2025-07-17 | End: 2025-07-17 | Stop reason: HOSPADM

## 2025-07-17 RX ORDER — ONDANSETRON HYDROCHLORIDE 2 MG/ML
INJECTION, SOLUTION INTRAVENOUS AS NEEDED
Status: DISCONTINUED | OUTPATIENT
Start: 2025-07-17 | End: 2025-07-17

## 2025-07-17 RX ORDER — ROCURONIUM BROMIDE 10 MG/ML
INJECTION, SOLUTION INTRAVENOUS AS NEEDED
Status: DISCONTINUED | OUTPATIENT
Start: 2025-07-17 | End: 2025-07-17

## 2025-07-17 RX ORDER — GABAPENTIN 300 MG/1
600 CAPSULE ORAL ONCE
Status: COMPLETED | OUTPATIENT
Start: 2025-07-17 | End: 2025-07-17

## 2025-07-17 RX ORDER — IBUPROFEN 600 MG/1
600 TABLET, FILM COATED ORAL EVERY 6 HOURS PRN
Qty: 28 TABLET | Refills: 0 | Status: SHIPPED | OUTPATIENT
Start: 2025-07-17 | End: 2025-07-24

## 2025-07-17 RX ORDER — VANCOMYCIN HYDROCHLORIDE 1 G/200ML
1000 INJECTION, SOLUTION INTRAVENOUS ONCE
Status: COMPLETED | OUTPATIENT
Start: 2025-07-17 | End: 2025-07-17

## 2025-07-17 RX ORDER — SODIUM CHLORIDE, SODIUM LACTATE, POTASSIUM CHLORIDE, CALCIUM CHLORIDE 600; 310; 30; 20 MG/100ML; MG/100ML; MG/100ML; MG/100ML
INJECTION, SOLUTION INTRAVENOUS CONTINUOUS PRN
Status: DISCONTINUED | OUTPATIENT
Start: 2025-07-17 | End: 2025-07-17

## 2025-07-17 RX ORDER — PHENYLEPHRINE 10 MG/250 ML(40 MCG/ML)IN 0.9 % SOD.CHLORIDE INTRAVENOUS
CONTINUOUS PRN
Status: DISCONTINUED | OUTPATIENT
Start: 2025-07-17 | End: 2025-07-17

## 2025-07-17 RX ORDER — LIDOCAINE HYDROCHLORIDE 20 MG/ML
INJECTION, SOLUTION INFILTRATION; PERINEURAL AS NEEDED
Status: DISCONTINUED | OUTPATIENT
Start: 2025-07-17 | End: 2025-07-17

## 2025-07-17 RX ORDER — ACETAMINOPHEN 325 MG/1
975 TABLET ORAL ONCE
Status: COMPLETED | OUTPATIENT
Start: 2025-07-17 | End: 2025-07-17

## 2025-07-17 RX ORDER — ONDANSETRON HYDROCHLORIDE 2 MG/ML
4 INJECTION, SOLUTION INTRAVENOUS ONCE AS NEEDED
Status: DISCONTINUED | OUTPATIENT
Start: 2025-07-17 | End: 2025-07-17 | Stop reason: HOSPADM

## 2025-07-17 RX ORDER — ACETAMINOPHEN 325 MG/1
650 TABLET ORAL EVERY 6 HOURS PRN
Qty: 56 TABLET | Refills: 0 | Status: SHIPPED | OUTPATIENT
Start: 2025-07-17 | End: 2025-07-24

## 2025-07-17 RX ORDER — ALBUTEROL SULFATE 0.83 MG/ML
2.5 SOLUTION RESPIRATORY (INHALATION) ONCE AS NEEDED
Status: DISCONTINUED | OUTPATIENT
Start: 2025-07-17 | End: 2025-07-17 | Stop reason: HOSPADM

## 2025-07-17 RX ORDER — TRAMADOL HYDROCHLORIDE 50 MG/1
50 TABLET, FILM COATED ORAL EVERY 6 HOURS PRN
Qty: 12 TABLET | Refills: 0 | Status: SHIPPED | OUTPATIENT
Start: 2025-07-17 | End: 2025-07-20

## 2025-07-17 RX ORDER — MIDAZOLAM HYDROCHLORIDE 2 MG/2ML
INJECTION, SOLUTION INTRAMUSCULAR; INTRAVENOUS AS NEEDED
Status: DISCONTINUED | OUTPATIENT
Start: 2025-07-17 | End: 2025-07-17

## 2025-07-17 RX ORDER — SULFAMETHOXAZOLE AND TRIMETHOPRIM 800; 160 MG/1; MG/1
1 TABLET ORAL 2 TIMES DAILY
Qty: 14 TABLET | Refills: 0 | Status: SHIPPED | OUTPATIENT
Start: 2025-07-17 | End: 2025-07-24

## 2025-07-17 RX ORDER — DIPHENHYDRAMINE HYDROCHLORIDE 50 MG/ML
25 INJECTION, SOLUTION INTRAMUSCULAR; INTRAVENOUS ONCE AS NEEDED
Status: DISCONTINUED | OUTPATIENT
Start: 2025-07-17 | End: 2025-07-17 | Stop reason: HOSPADM

## 2025-07-17 RX ORDER — FENTANYL CITRATE 50 UG/ML
INJECTION, SOLUTION INTRAMUSCULAR; INTRAVENOUS AS NEEDED
Status: DISCONTINUED | OUTPATIENT
Start: 2025-07-17 | End: 2025-07-17

## 2025-07-17 RX ORDER — PHENYLEPHRINE HCL IN 0.9% NACL 1 MG/10 ML
SYRINGE (ML) INTRAVENOUS AS NEEDED
Status: DISCONTINUED | OUTPATIENT
Start: 2025-07-17 | End: 2025-07-17

## 2025-07-17 RX ORDER — FLUCONAZOLE 2 MG/ML
400 INJECTION, SOLUTION INTRAVENOUS ONCE
Status: COMPLETED | OUTPATIENT
Start: 2025-07-17 | End: 2025-07-17

## 2025-07-17 RX ORDER — CELECOXIB 200 MG/1
400 CAPSULE ORAL ONCE
Status: COMPLETED | OUTPATIENT
Start: 2025-07-17 | End: 2025-07-17

## 2025-07-17 RX ORDER — PROPOFOL 10 MG/ML
INJECTION, EMULSION INTRAVENOUS AS NEEDED
Status: DISCONTINUED | OUTPATIENT
Start: 2025-07-17 | End: 2025-07-17

## 2025-07-17 RX ADMIN — Medication 200 MCG: at 11:14

## 2025-07-17 RX ADMIN — PROPOFOL 150 MG: 10 INJECTION, EMULSION INTRAVENOUS at 10:14

## 2025-07-17 RX ADMIN — FENTANYL CITRATE 50 MCG: 50 INJECTION, SOLUTION INTRAMUSCULAR; INTRAVENOUS at 10:37

## 2025-07-17 RX ADMIN — ACETAMINOPHEN 975 MG: 325 TABLET ORAL at 08:13

## 2025-07-17 RX ADMIN — GABAPENTIN 600 MG: 300 CAPSULE ORAL at 08:13

## 2025-07-17 RX ADMIN — Medication 200 MCG: at 10:58

## 2025-07-17 RX ADMIN — VANCOMYCIN HYDROCHLORIDE 1000 MG: 1 INJECTION, SOLUTION INTRAVENOUS at 08:11

## 2025-07-17 RX ADMIN — OXYCODONE HYDROCHLORIDE 5 MG: 5 TABLET ORAL at 12:56

## 2025-07-17 RX ADMIN — PIPERACILLIN SODIUM AND TAZOBACTAM SODIUM 3.38 G: 3; .375 INJECTION, SOLUTION INTRAVENOUS at 08:14

## 2025-07-17 RX ADMIN — PHENYLEPHRINE-NACL IV SOLUTION 10 MG/250ML-0.9% 0.5 MCG/KG/MIN: 10-0.9/25 SOLUTION at 11:29

## 2025-07-17 RX ADMIN — CELECOXIB 400 MG: 200 CAPSULE ORAL at 08:13

## 2025-07-17 RX ADMIN — FLUCONAZOLE 400 MG: 2 INJECTION, SOLUTION INTRAVENOUS at 10:24

## 2025-07-17 RX ADMIN — SUGAMMADEX 200 MG: 100 INJECTION, SOLUTION INTRAVENOUS at 12:02

## 2025-07-17 RX ADMIN — FENTANYL CITRATE 50 MCG: 50 INJECTION, SOLUTION INTRAMUSCULAR; INTRAVENOUS at 10:51

## 2025-07-17 RX ADMIN — MIDAZOLAM HYDROCHLORIDE 2 MG: 1 INJECTION, SOLUTION INTRAMUSCULAR; INTRAVENOUS at 10:10

## 2025-07-17 RX ADMIN — Medication 100 MCG: at 10:26

## 2025-07-17 RX ADMIN — SODIUM CHLORIDE, POTASSIUM CHLORIDE, SODIUM LACTATE AND CALCIUM CHLORIDE: 600; 310; 30; 20 INJECTION, SOLUTION INTRAVENOUS at 11:59

## 2025-07-17 RX ADMIN — SODIUM CHLORIDE, POTASSIUM CHLORIDE, SODIUM LACTATE AND CALCIUM CHLORIDE: 600; 310; 30; 20 INJECTION, SOLUTION INTRAVENOUS at 10:10

## 2025-07-17 RX ADMIN — ROCURONIUM BROMIDE 50 MG: 10 INJECTION, SOLUTION INTRAVENOUS at 10:42

## 2025-07-17 RX ADMIN — LIDOCAINE HYDROCHLORIDE 100 MG: 20 INJECTION, SOLUTION INFILTRATION; PERINEURAL at 10:14

## 2025-07-17 RX ADMIN — DEXAMETHASONE SODIUM PHOSPHATE 4 MG: 4 INJECTION, SOLUTION INTRA-ARTICULAR; INTRALESIONAL; INTRAMUSCULAR; INTRAVENOUS; SOFT TISSUE at 10:27

## 2025-07-17 RX ADMIN — Medication 200 MCG: at 11:05

## 2025-07-17 RX ADMIN — PROPOFOL 50 MG: 10 INJECTION, EMULSION INTRAVENOUS at 12:01

## 2025-07-17 RX ADMIN — ONDANSETRON 4 MG: 2 INJECTION, SOLUTION INTRAMUSCULAR; INTRAVENOUS at 11:43

## 2025-07-17 RX ADMIN — Medication 200 MCG: at 11:20

## 2025-07-17 ASSESSMENT — PAIN SCALES - GENERAL
PAINLEVEL_OUTOF10: 4
PAINLEVEL_OUTOF10: 2
PAINLEVEL_OUTOF10: 0 - NO PAIN
PAINLEVEL_OUTOF10: 2

## 2025-07-17 ASSESSMENT — COLUMBIA-SUICIDE SEVERITY RATING SCALE - C-SSRS
2. HAVE YOU ACTUALLY HAD ANY THOUGHTS OF KILLING YOURSELF?: NO
1. IN THE PAST MONTH, HAVE YOU WISHED YOU WERE DEAD OR WISHED YOU COULD GO TO SLEEP AND NOT WAKE UP?: NO
6. HAVE YOU EVER DONE ANYTHING, STARTED TO DO ANYTHING, OR PREPARED TO DO ANYTHING TO END YOUR LIFE?: NO

## 2025-07-17 ASSESSMENT — PAIN - FUNCTIONAL ASSESSMENT
PAIN_FUNCTIONAL_ASSESSMENT: 0-10

## 2025-07-17 ASSESSMENT — PAIN DESCRIPTION - LOCATION: LOCATION: GROIN

## 2025-07-17 ASSESSMENT — PAIN DESCRIPTION - DESCRIPTORS
DESCRIPTORS: SHARP
DESCRIPTORS: SHARP
DESCRIPTORS: SORE

## 2025-07-17 ASSESSMENT — PAIN DESCRIPTION - ORIENTATION: ORIENTATION: RIGHT

## 2025-07-17 NOTE — DISCHARGE INSTRUCTIONS
[facility] [place]    DISCHARGE INSTRUCTIONS      Penile Prosthesis    Aakash Chambers    Call Urology Department [phone] during regular daytime business hours (8:00 am - 5:00 pm) and after 5:00 pm ask for the Urology resident with any questions or concerns.      If it is a life-threatening situation, proceed to the nearest emergency department.        Follow-up appointment:  7/21/25- MANDO Bustamante; 8/11/25- Dr. Reese     Thank you for the opportunity to care for you today.  Your health and healing are very important to us.  We hope we made you feel as comfortable as possible and are committed to your recovery and continued well-being.      The following is a brief overview of your penile prosthesis procedure. Some of the information contained on this summary may be confidential.  This information should be kept in your records and should be shared with your regular doctor.    Physicians:   Dr. Reese     Procedure performed: insertion of penile prosthesis    What to Expect During your Recovery and Home Care  Anesthesia Side Effects   You received anesthesia today.  You may feel sleepy, tired, or have a sore throat.   You may also feel drowsiness, dizziness, or inability to think clearly.  For your safety, do not drive, drink alcoholic beverages, take any unprescribed medication or make any important decisions for 24 hours.  A responsible adult should be with you for 24 hours.       Activity and Recovery    Go home and rest. No strenuous activity and no heavy lifting over 10 lbs.     Pain Control  Unfortunately, you may experience pain after your procedure.  Adequate management can include alternative measures to help ease your pain and can include ice packs, scrotal support, and over the counter Tylenol. oxycodone may also be prescribe for breakthrough pain. Do not take more than 4,000 mg of Tylenol in a 24-hour period.     Nausea/Vomiting   Clear liquids are best tolerated at first. Start  slow, advance your diet as tolerated to normal foods. Avoid spicy, greasy, heavy foods at first. Also, you may feel nauseous or like you need to vomit if you take any type of medication on an empty stomach.  Call your physician if you are unable to eat or drink and have persistent vomiting.          Signs of Bleeding   Minor bleeding or drainage may occur from the surgical site; however, excessive or consistent bleeding should be reported to your surgeon. Excessive bleeding is defined as blood that is dripping from wound, soaking you bandages, and is ketchup colored, thick with possible blood clots.  Consistent is defined as bleeding that does not stop.    Treatment/wound care:   Keep area(s) clean and dry. You can wear gauze dressing on top of the incisions, so the scrotal support does not irritate the incisions. It is incredibly important that you keep the scrotum well supported for the first two weeks after the surgery. Continue to wear the scrotal support/jock straps/tight fitting underwear.  If you have a drain in place, we will schedule an appointment for the drain to be removed.  The device is purposefully left partially inflated (approximately 30 to 40 percent). This will be deflated at the two week visit.  Pull the scrotal pump straight down towards the ground in the shower when the scrotal skin is supple. You should also do this every time you go to the bathroom This will allow the pump to heal in a nice dependent position.  It is okay to shower 48 hours after time of surgery.  Do not submerge incisions in water (tub bath, swimming) until incisions have completely healed.  Do not scrub wound(s), pat dry.  Clean with mild soap. Do not use oils or lotions on incisions.    Please visually inspect your wound(s) at least once daily.  If the wound(s) are in a difficult to see location, please use a mirror or have someone else assist with visual inspection.    Signs of Infection  Signs of infection can include  fever, excessive swelling, heat, drainage, redness, or severe pain.  If you see any of these occur, please contact [phone]. Any fever higher than 100.4, especially if associated with an ill feeling, abdominal pain, chills, or nausea should be reported to your surgeon.        Additional Instructions:   No sex until discussed further at your follow up appointment. Your device has been left partially inflated, do not deflate, or inflate further. You should wear tight fitting underwear for the next six weeks. At your six week follow up you will be taught how to use your device.

## 2025-07-17 NOTE — ANESTHESIA POSTPROCEDURE EVALUATION
Patient: Aakash Chambers    Procedure Summary       Date: 07/17/25 Room / Location: [facility] A OR 04 / Virtual [facility] A OR    Anesthesia Start: 1010 Anesthesia Stop: 1224    Procedure: Penile Prosthesis Insertion Diagnosis:       Erectile dysfunction, unspecified erectile dysfunction type      (Erectile dysfunction, unspecified erectile dysfunction type [N52.9])    Surgeons: Westley Reese MD Responsible Provider: Alfredito Morales MD    Anesthesia Type: general ASA Status: 3            Anesthesia Type: general    Vitals Value Taken Time   /81 07/17/25 13:14   Temp 36.5 °C (97.7 °F) 07/17/25 13:14   Pulse 84 07/17/25 13:14   Resp 16 07/17/25 13:14   SpO2 99 % 07/17/25 13:14       Anesthesia Post Evaluation    Patient location during evaluation: PACU  Patient participation: complete - patient participated  Level of consciousness: awake and alert  Pain management: adequate  Airway patency: patent  Cardiovascular status: acceptable and hemodynamically stable  Respiratory status: acceptable, spontaneous ventilation and nonlabored ventilation  Hydration status: acceptable  Postoperative Nausea and Vomiting: none        There were no known notable events for this encounter.

## 2025-07-17 NOTE — ANESTHESIA PROCEDURE NOTES
Airway  Date/Time: 7/17/2025 10:16 AM  Reason: elective      Staffing  Performed: RICKY   Authorized by: Alfredito Morales MD    Performed by: RICKY Fam  Patient location during procedure: OR    Patient Condition  Indications for airway management: anesthesia  Patient position: sniffing  MILS not maintained throughout  Planned trial extubation  Sedation level: deep     Final Airway Details   Preoxygenated: yes  Final airway type: supraglottic airway  Successful airway: Supraglottic airway: Igel.  Size: 5  Number of attempts at approach: 1  Number of other approaches attempted: 0    Additional Comments  Atraumatic LMA placement, tongue protected with soft gauze

## 2025-07-17 NOTE — OP NOTE
Penile Prosthesis Insertion Operative Note     Date: 2025  OR Location: [facility] A OR    Name: Aakash Chambers, : 1970, Age: 55 y.o., MRN: [id number], Sex: male    Diagnosis  Pre-op Diagnosis      * Erectile dysfunction, unspecified erectile dysfunction type [N52.9] Post-op Diagnosis     * Erectile dysfunction, unspecified erectile dysfunction type [N52.9]     Procedures  Penile Prosthesis Insertion  47045 - MN INSJ MULTI-COMPONENT INFLATABLE PENILE PROSTH    MN ADJT TIS TRNS/REARGMT F/C/C/M/N/A/G/H/F 10SQCM/< [24527]  MN INJECTION CORPORA CAVERNOSA PHARMACOLOGIC AGENT [39720]  MN PROSTHESIS, PENILE, INFLATABLE []  Surgeons      * Westley Reese - Primary    Resident/Fellow/Other Assistant:  Surgeons and Role:     * Ember Strong MD - Resident - Assisting    Staff:   Circulator: Ines Navarrete Person: Chuckie Gallegos Circulator: Elena    Anesthesia Staff: Anesthesiologist: Alfredito Morales MD  C-AA: RICKY Fam; RICKY Aguero    Procedure Summary  Anesthesia: General  ASA: III  Estimated Blood Loss: 10mL  Intra-op Medications:   Administrations occurring from 0915 to 1150 on 25:   Medication Name Total Dose   BUPivacaine HCl (Marcaine) 0.5 % (5 mg/mL) 29 mL, dexAMETHasone (Decadron) 4 mg syringe Cannot be calculated   lidocaine (Xylocaine) 20 mL in sodium chloride 0.9% 100 mL syringe 120 mL   fluconazole (Diflucan) 400 mg in sodium chloride (iso)  mL 400 mg   dexAMETHasone (Decadron) 4 mg/mL IV Syringe 2 mL 4 mg   fentaNYL (Sublimaze) injection 50 mcg/mL 100 mcg   lactated Ringer's infusion Cannot be calculated   lidocaine (Xylocaine) injection 2 % 100 mg   midazolam PF (Versed) injection 1 mg/mL 2 mg   ondansetron (Zofran) 2 mg/mL injection 4 mg   phenylephrine (Umang-Synephrine) 10 mg/250 mL NS (40 mcg/mL) infusion 0.72 mg   phenylephrine 100 mcg/mL syringe 10 mL (prefilled) 900 mcg   propofol (Diprivan) injection 10 mg/mL 150 mg   rocuronium 10 mg/mL 50 mg               Anesthesia Record               Intraprocedure I/O Totals          Intake    Phenylephrine Drip 0.00 mL    The total shown is the total volume documented since Anesthesia Start was filed.    lactated Ringer's 1000.00 mL    fluconazole (Diflucan) 400 mg in sodium chloride (iso)  mL 200.00 mL    Total Intake 1200 mL          Specimen: No specimens collected              Drains and/or Catheters:   Closed/Suction Drain 1 RLQ Bulb 15 Fr. (Active)       [REMOVED] Urethral Catheter 16 Fr. (Removed)       Tourniquet Times:         Implants:  Implants       Type Name Action Serial No.      Penile Prosthesis TITAN ASSEMBLY KIT Implanted NA     Implant RESERVOIR, TITAN CL, WITH LOCK OUT VALVE, 125CC - SNA - [id number] Implanted NA     Implant CYLINDER PUMP SET, TITAN ZERO DEGREE, SCROTAL, 20CM - SN/A - [id number] Implanted N/A     Implant EXTENDER PACKAGE, REAR TIP - SN/A - [id number] Implanted N/A              Findings: No abnormalities note. Uncomplicated IPP placement    Indications: Aakash Chambers is an 55 y.o. male who is having surgery for Erectile dysfunction, unspecified erectile dysfunction type [N52.9].    The patient was seen in the preoperative area. The risks, benefits, complications, treatment options, non-operative alternatives, expected recovery and outcomes were discussed with the patient. The possibilities of reaction to medication, pulmonary aspiration, injury to surrounding structures, bleeding, recurrent infection, the need for additional procedures, failure to diagnose a condition, and creating a complication requiring transfusion or operation were discussed with the patient. The patient concurred with the proposed plan, giving informed consent.  The site of surgery was properly noted/marked if necessary per policy. The patient has been actively warmed in preoperative area. Preoperative antibiotics have been ordered and given within 1 hours of incision. Venous thrombosis prophylaxis have been  ordered including bilateral sequential compression devices    Procedure Details:   Placement of Coloplast Titan 3-piece inflatable penile prosthesis.     Cylinders:  21 cm bilaterally     Rear tips: N/a     Reservoir: 125cc mL              Site:  Left space of retzius    The patient was correctly identified in the preoperative holding area and informed consent was obtained and documented.  He was examined in the preoperative area to ensure he had no scrotal or perineal rashes or skin infections. He received preoperative antibiotics in the form of vancomycin, zosyn, and fluconazole IV per pharmacy protocol. He was then brought to the operating room and placed on the table in supine position.  After a universal timeout, venodyne boots were placed and after adequate induction, general anesthesia was given. The external genitalia was shaved with clippers making sure not damage the scrotal or penile skin.  A penile block was performed with a total of 10 mL of 0.5% Marcaine and a pudendal block was performed with 10mL of local on each side. The patient was then prepped with Chloroprep and drapped in a multilayer surgical fashion.     A 16 Upper sorbian Guzmán catheter was placed sterilely on the field and the balloon inflated with 5 cc of sterile water. A Rob retractor was brought onto the field and the urethral hook was placed at the 12 o'clock position of the meatus and penis was placed on stretch. A 3 cm incision was made sharply with the scalpel approximately one fingerbreadth below the penoscrotal junction in a transverse fashion. All surgeons then changed their top layer of gloves. [place] were then used with the Rob retractor to properly expose the field.      The incision was then brought down to the dartos fascia using Bovie electrocautery layer by layer until the corpora cavernosa were exposed.  2-0 Vicryl holding sutures were placed and corporotomies were made using Bovie electrocautery.  10cc of marcaine was  injected into the corpora. The corpora were easily dilated with the #11 Garland dilators.  Using the Dayna the patient's right corpora were measured to be 11 cm in the proximal direction and 10 cm in the distal direction and his left corpora were measured to be 10 cm in the proximal direction and 11 cm in the distal direction therefore we decided to place 21 cm cylinders with no rear tips.      We then turned our attention to the space of Retzius.  This was bluntly dissected on the left hand side by the surgeon.  The space was created through which we placed the reservoir.  The reservoir was then filled with 90 mL of sterile saline. Copious amounts of antibiotic solution were used during this dissection and placement.    Once this was complete, the penile prosthesis was prepared off the field and brought onto the field in antibiotic solution. The penile prosthesis cylinders were placed first proximally and then distally using a Thaddeus needle on a Dayna passer.  The holding sutures were then tied down to close the corporotomies. Copious amounts of antibiotic solution were used for irrigation during this part of the procedure.      A Laura clamp and nasal speculum were used to develop a dartos pouch in which the penile prosthesis pump was placed. The pump was placed in the most dependent region of the scrotum and held down.  The tubing was then connected using connectors from the penile prosthesis kit.  Intervening tissue was closed using 2-0 Vicryl suture.      A ABELARDO drain was placed on the right hand side of the groin through a stab incision and placed dependently over the pump.  The dartos was then closed in a running fashion using a 2-0 Vicryl suture after copious amounts of antibiotic irrigation.  The skin was then reapproximated using 4-0 Monocryl sutures in running horizontal mattress fashion. The wound was then cleaned and dried and dermabond was applied to the scrotal incision and each of the thaddeus needle exit  points in the glans. Scrotal support and scrotal fluffs were placed on the patient. Implant was deflated and the Guzmán catheter was removed.  The patient was then awoken from general anesthesia and seemed to tolerate the procedure quite well. He was transferred to the stretcher and brought back to the PACU in stable condition.      Evidence of Infection: No   Complications:  None; patient tolerated the procedure well.    Disposition: PACU - hemodynamically stable.  Condition: stable       Additional Details: Patient to follow up outpatient as previously scheduled    Attending Attestation: I was present and scrubbed for the key portions of the procedure.    Westley Reese  Phone Number: [phone]

## 2025-07-17 NOTE — ANESTHESIA PROCEDURE NOTES
Airway  Date/Time: 7/17/2025 10:45 AM  Reason: elective    Difficult airway    Staffing  Performed: RICKY   Authorized by: Alfredito Morales MD    Performed by: RICKY Fam  Patient location during procedure: OR    Patient Condition  Indications for airway management: anesthesia  Patient position: sniffing  MILS not maintained throughout  Planned trial extubation  Sedation level: deep     Final Airway Details   Preoxygenated: yes  Final airway type: endotracheal airway  Successful airway: ETT  Cuffed: yes   Successful intubation technique: video laryngoscopy  Adjuncts used in placement: intubating stylet  Endotracheal tube insertion site: oral  Blade size: #4  ETT size (mm): 7.0  Cormack-Lehane Classification: grade I - full view of glottis  Placement verified by: chest auscultation and capnometry   Cuff volume (mL): 6  Measured from: lips  ETT to lips (cm): 22  Number of attempts at approach: 1  Number of other approaches attempted: 0    Additional Comments  Patient initially had an LMA placed but became more difficult to ventilate, so it was decided to exchange airway to an ETT. First initial attempt was DL w/ Mac 4 and only a grade-III view was achieved. Second attempt done with a Glidescope showing a grade-I view and airway placed without complication.

## 2025-07-18 ASSESSMENT — PAIN SCALES - GENERAL: PAINLEVEL_OUTOF10: 0 - NO PAIN

## 2025-07-21 ENCOUNTER — OFFICE VISIT (OUTPATIENT)
Dept: UROLOGY | Facility: HOSPITAL | Age: 55
End: 2025-07-21
Payer: COMMERCIAL

## 2025-07-21 DIAGNOSIS — N52.9 ERECTILE DYSFUNCTION, UNSPECIFIED ERECTILE DYSFUNCTION TYPE: Primary | ICD-10-CM

## 2025-07-21 DIAGNOSIS — Z48.89 POSTOPERATIVE VISIT: ICD-10-CM

## 2025-07-21 PROCEDURE — 99213 OFFICE O/P EST LOW 20 MIN: CPT | Performed by: NURSE PRACTITIONER

## 2025-07-21 PROCEDURE — 4010F ACE/ARB THERAPY RXD/TAKEN: CPT | Performed by: NURSE PRACTITIONER

## 2025-07-21 PROCEDURE — 1036F TOBACCO NON-USER: CPT | Performed by: NURSE PRACTITIONER

## 2025-07-21 NOTE — PROGRESS NOTES
[facility]  Outpatient Clinic Note    Patient Name:  Aakash Chambers  MRN:  [id number]  :  1970  Date of Service: 2025     Visit type: Follow up visit    HPI    Interval History:  Aakash Chambers is a 55 y.o. male who is being seen today for  problems listed below.     Problem list/Chief complaints:  Severe ED - S/p IPP insertion on 25 with Dr. Reese  LUTS - Cialis 5 mg daily    Visit with Dr. Reese.  Last visit 25:  -will consider inflatable penile prosthesis  - educational info given  -penoscrotal  -Coloplast  -vanc/zosyn  -space retzius for reservoir  -will check insurance benefits  #BPH  -continue Cialis 5mg daily  Fuv in 1 month for repeat implant consult     Today's visit: 25   #Erectile Dysfunction   -is back on Cialis 20mg and is adequate and tolerable, happier with this rather than injections  -he is able to have an erection and maintain it for penetration, but not fully happy  -would like to discuss implants  -mix 5 --> tried 4 times at 10 units, didn't work well enough, no side effects  -doppler 2024 - 50% with 20 of 5     s/p prostatectomy: no  On nitrates/NTG?: No  Libido/Desire: Good  Have been on Testosterone /anabolic steroids? No     #LUTS  -denies hematuria and dysuria   -PVR 270ml today  -on Cialis 5mg daily    7/3/25: Visit with Sonia Abreu CNP. Patient presenting to clinic today for preoperative education. Pending IPP with Dr. Reese on 2025. History of ED refractory to pde5i. - Patient will start Bactrim twice daily, two days prior to surgery. Sent to pharmacy today     25: S/p IPP insertion with Dr. Reese    25: Patient presents for POV and drain removal. He has been doing well since surgery. penoscrotal incision with dermabond intact, no drainage. ABELARDO with scant SS drainage. Cylinders are in good position; pump in posterior scrotum.  Pain has been managed with oral pain medications. He is eating and  drinking with no issues, he has had normal bowel movements. He denies any fevers or chills.    Medical History[1]    Surgical History[2]    Social History     Socioeconomic History    Marital status:      Spouse name: Not on file    Number of children: Not on file    Years of education: Not on file    Highest education level: Not on file   Occupational History    Not on file   Tobacco Use    Smoking status: Never    Smokeless tobacco: Never   Substance and Sexual Activity    Alcohol use: Not Currently     Alcohol/week: 8.0 standard drinks of alcohol     Types: 8 Cans of beer per week    Drug use: Not Currently    Sexual activity: Not Currently     Partners: Female   Other Topics Concern    Not on file   Social History Narrative    Not on file     Social Drivers of Health     Financial Resource Strain: Not on file   Food Insecurity: No Food Insecurity (5/5/2025)    Hunger Vital Sign     Worried About Running Out of Food in the Last Year: Never true     Ran Out of Food in the Last Year: Never true   Transportation Needs: Not on file   Physical Activity: Not on file   Stress: Not on file   Social Connections: Not on file   Intimate Partner Violence: Not on file   Housing Stability: Not on file       Allergies[3]     Current Medications[4]     Review of system:  All other systems have been reviewed and are negative for complaints      Last recorded vitals:  There were no vitals taken for this visit.    Physical Exam:  General: Appears comfortable and in no apparent distress.  Head: Normocephalic, atraumatic  Eyes: Non-injected conjunctiva, sclera clear, no proptosis  Lungs: Breathing is easy, non-labored. Speaking in clear and complete sentences. Normal diaphragmatic movement.  Cardiovascular: no peripheral edema, cyanosis or pallor.   Abdomen: soft, non-distended, non-tender  : Bladder: non tender, not distended  MSK: Ambulatory with steady gait, unassisted  Skin: No visible rashes or lesions  Neurologic:  Alert, oriented to person, place, and time  Psychiatric: mood and affect appropriate      Labs  Lab Results   Component Value Date    WBC 6.1 07/11/2025    HGB 14.7 07/11/2025    HCT 42.5 07/11/2025    MCV 95.7 07/11/2025     07/11/2025     Lab Results   Component Value Date    GLUCOSE 95 07/11/2025    CALCIUM 9.4 07/11/2025     (L) 07/11/2025    K 3.7 07/11/2025    CO2 27 07/11/2025    CL 90 (L) 07/11/2025    BUN 10 07/11/2025    CREATININE 0.84 07/11/2025     Lab Results   Component Value Date    PSA 0.42 03/02/2022       Assessment and Plan:  Aakash Chambers is a 55 y.o. male with history of LUTS, ED s/p IPP insertion on 7/17/25 with Dr. Reese, who presents for POV and drain removal.     On exam, patient appears well. Pump is palpable within scrotum. Implants are in place. Penoscrotal incision is clean, dry and well approximated without drainage. Urethral meatus is nontender, without erythema and without drainage.     Patient was encouraged to continue to gently pull pump into scrotum.     He was educated on postoperative care and was instructed to refrain from sexual activity for 4 more weeks.    Plan:  -Reviewed ABELARDO drain record with patient.   -Drain removed and patient tolerated well.   -He was educated on postoperative incision care.   -He was educated on activity restrictions.    -Will follow-up with Dr. Reese next week for postoperative visit and pump check. He will call with any new or worsening symptoms.       All questions and concerns were addressed. Patient verbalizes understanding and has no other questions at this time.     Some elements copied from Sonia Abreu's, CNP note on 7/3/25 , the elements have been updated and all reflect current decision making from today, 07/21/25    E&M visit today is associated with current or anticipated ongoing medical care services related to a patient's single, serious condition or a complex condition.    MANDO Martinez-CNP    [facility]  07/21/25 1:28 PM         [1]   Past Medical History:  Diagnosis Date    Alcohol-induced acute pancreatitis without infection or necrosis ([facility]) 10/22/2016    Anxiety 2002    Arthritis     Diabetes mellitus (Multi)     Difficult intubation 07/17/2025    Easy mask with oral airway, Mac 4, grade 3, 1 attempt, intubation not attempted. Glidescope 4, grade 1, success intubation on first attempt.    Erectile dysfunction     GERD (gastroesophageal reflux disease)     Hypertension 07/05/2000    Yes    Joint pain     Pancreatitis ([facility]) 09/05/2023    Scoliosis    [2]   Past Surgical History:  Procedure Laterality Date    COLONOSCOPY      PENILE PROSTHESIS IMPLANT  07/17/2025    with Dr. Reese    TOTAL HIP ARTHROPLASTY Left 09/14/2016   [3] No Known Allergies  [4]   Current Outpatient Medications:     acetaminophen (Tylenol) 325 mg tablet, Take 2 tablets (650 mg) by mouth every 6 hours if needed for mild pain (1 - 3) for up to 7 days., Disp: 56 tablet, Rfl: 0    ALPRAZolam (Xanax) 0.5 mg tablet, Take 1 tablet by mouth twice daily, Disp: 60 tablet, Rfl: 2    ammonium lactate (Amlactin) 12 % cream, Apply topically 2 times a day. to affected area, Disp: , Rfl:     aspirin 81 mg EC tablet, Take 1 tablet (81 mg) by mouth once daily., Disp: , Rfl:     atorvastatin (Lipitor) 40 mg tablet, Take 1 tablet by mouth once daily, Disp: 90 tablet, Rfl: 1    chlorthalidone (Hygroton) 25 mg tablet, Take 1 tablet by mouth once daily, Disp: 90 tablet, Rfl: 1    cholecalciferol (Vitamin D-3) 25 mcg (1,000 units) tablet, Take 1 tablet by mouth once daily, Disp: 90 tablet, Rfl: 1    cyclobenzaprine (Flexeril) 10 mg tablet, TAKE 1 TABLET BY MOUTH AS NEEDED AT BEDTIME FOR MUSCLE SPASM, Disp: 90 tablet, Rfl: 1    ibuprofen 600 mg tablet, Take 1 tablet (600 mg) by mouth every 6 hours if needed for mild pain (1 - 3) for up to 7 days. Can alternate with tylenol. Do not take more than prescribed dose in 24 hrs, Disp:  28 tablet, Rfl: 0    ibuprofen 800 mg tablet, Take 1 tablet (800 mg) by mouth 3 times a day as needed (WITH FOOD)., Disp: 90 tablet, Rfl: 3    indomethacin (Indocin) 50 mg capsule, Take 1 capsule (50 mg) by mouth 2 times a day., Disp: , Rfl:     lisinopril 40 mg tablet, Take 1 tablet by mouth once daily, Disp: 90 tablet, Rfl: 1    metFORMIN (Glucophage) 500 mg tablet, Take 1 tablet by mouth once daily, Disp: 90 tablet, Rfl: 1    multivitamin tablet, Take 1 tablet by mouth once daily., Disp: , Rfl:     naloxone (Narcan) 4 mg/0.1 mL nasal spray, USE AS DIRECTED, Disp: , Rfl:     omeprazole (PriLOSEC) 40 mg DR capsule, TAKE 1 CAPSULE BY MOUTH ONCE DAILY FOR HEARTBURN, Disp: 90 capsule, Rfl: 1    sulfamethoxazole-trimethoprim (Bactrim DS) 800-160 mg tablet, Take 1 tablet by mouth 2 times a day for 7 days., Disp: 14 tablet, Rfl: 0    traMADol (Ultram) 50 mg tablet, Take 1 tablet (50 mg) by mouth 2 times a day as needed for moderate pain (4 - 6)., Disp: 60 tablet, Rfl: 2

## 2025-07-23 DIAGNOSIS — K21.9 GASTROESOPHAGEAL REFLUX DISEASE WITHOUT ESOPHAGITIS: ICD-10-CM

## 2025-07-23 RX ORDER — OMEPRAZOLE 40 MG/1
CAPSULE, DELAYED RELEASE ORAL
Qty: 90 CAPSULE | Refills: 1 | Status: SHIPPED | OUTPATIENT
Start: 2025-07-23

## 2025-07-30 ENCOUNTER — HOSPITAL ENCOUNTER (EMERGENCY)
Facility: HOSPITAL | Age: 55
Discharge: HOME | End: 2025-07-30
Attending: STUDENT IN AN ORGANIZED HEALTH CARE EDUCATION/TRAINING PROGRAM
Payer: COMMERCIAL

## 2025-07-30 ENCOUNTER — TELEPHONE (OUTPATIENT)
Dept: PRIMARY CARE | Facility: CLINIC | Age: 55
End: 2025-07-30
Payer: COMMERCIAL

## 2025-07-30 VITALS
OXYGEN SATURATION: 100 % | BODY MASS INDEX: 22.24 KG/M2 | TEMPERATURE: 97.7 F | RESPIRATION RATE: 16 BRPM | SYSTOLIC BLOOD PRESSURE: 136 MMHG | WEIGHT: 155 LBS | HEART RATE: 95 BPM | DIASTOLIC BLOOD PRESSURE: 83 MMHG

## 2025-07-30 DIAGNOSIS — T78.3XXA ANGIOEDEMA, INITIAL ENCOUNTER: Primary | ICD-10-CM

## 2025-07-30 PROCEDURE — 99284 EMERGENCY DEPT VISIT MOD MDM: CPT | Performed by: STUDENT IN AN ORGANIZED HEALTH CARE EDUCATION/TRAINING PROGRAM

## 2025-07-30 PROCEDURE — 96374 THER/PROPH/DIAG INJ IV PUSH: CPT

## 2025-07-30 PROCEDURE — 99284 EMERGENCY DEPT VISIT MOD MDM: CPT | Mod: 25 | Performed by: STUDENT IN AN ORGANIZED HEALTH CARE EDUCATION/TRAINING PROGRAM

## 2025-07-30 PROCEDURE — 2500000004 HC RX 250 GENERAL PHARMACY W/ HCPCS (ALT 636 FOR OP/ED): Mod: JZ | Performed by: STUDENT IN AN ORGANIZED HEALTH CARE EDUCATION/TRAINING PROGRAM

## 2025-07-30 PROCEDURE — 96375 TX/PRO/DX INJ NEW DRUG ADDON: CPT

## 2025-07-30 RX ORDER — PANTOPRAZOLE SODIUM 40 MG/10ML
40 INJECTION, POWDER, LYOPHILIZED, FOR SOLUTION INTRAVENOUS ONCE
Status: COMPLETED | OUTPATIENT
Start: 2025-07-30 | End: 2025-07-30

## 2025-07-30 RX ADMIN — PANTOPRAZOLE SODIUM 40 MG: 40 INJECTION, POWDER, LYOPHILIZED, FOR SOLUTION INTRAVENOUS at 11:02

## 2025-07-30 RX ADMIN — METHYLPREDNISOLONE SODIUM SUCCINATE 125 MG: 125 INJECTION, POWDER, FOR SOLUTION INTRAMUSCULAR; INTRAVENOUS at 11:02

## 2025-07-30 ASSESSMENT — LIFESTYLE VARIABLES
HAVE YOU EVER FELT YOU SHOULD CUT DOWN ON YOUR DRINKING: NO
EVER FELT BAD OR GUILTY ABOUT YOUR DRINKING: NO
EVER HAD A DRINK FIRST THING IN THE MORNING TO STEADY YOUR NERVES TO GET RID OF A HANGOVER: NO
TOTAL SCORE: 0
HAVE PEOPLE ANNOYED YOU BY CRITICIZING YOUR DRINKING: NO

## 2025-07-30 ASSESSMENT — PAIN - FUNCTIONAL ASSESSMENT: PAIN_FUNCTIONAL_ASSESSMENT: 0-10

## 2025-07-30 ASSESSMENT — PAIN SCALES - GENERAL: PAINLEVEL_OUTOF10: 0 - NO PAIN

## 2025-07-30 NOTE — ED TRIAGE NOTES
Endorsing an allergic reaction that began this morning. Lower lip swelling. Denies difficulty breathing. No known allergies.     Been on lisinopril for 18 years

## 2025-07-30 NOTE — ED PROVIDER NOTES
[facility] ED Note    Date of Service: 7/30/2025  Reason for Visit: Allergic Reaction      Patient History     HPI  Aakash Chambers is a 55 y.o. male past medical Struve HTN (on lisinopril) presents the emergency department for swelling of his lower lip.  Patient states has been on lisinopril for approximate 25 years.  Patient states he woke up this morning with swelling of his lower lip.  He went to the urgent care where there was concern that this may be angioedema and he was then here for further evaluation.  Patient is controlling secretions appropriately, denies any difficulty swallowing, denies any difficulty controlling his saliva.  He denies any difficulty breathing.  He states his lower lip just feels swollen.  He took Benadryl this morning.  He denies any additional complaints this time.  Patient denies any new foods that he tried, any new allergic exposures that he can think of.  He denies any rash or any itchiness.      Medical History[1]  Surgical History[2]      Physical Exam     Vitals:    07/30/25 1033   BP: 136/83   Pulse: 95   Resp: 16   Temp: 36.5 °C (97.7 °F)   SpO2: 100%   Weight: 70.3 kg (155 lb)     General: Well-appearing, age-appropriate male, nontoxic, sitting comfortably in the chair no acute distress.  ENT: Edema of his lower lip.  No intraoral edema appreciated.  Controlling secretions well.  No trismus, no swelling of the neck appreciated.  No swelling of the uvula at this time.  Pulmonary:   Non-labored breathing. Breath sounds clear bilaterally.  Symmetric chest rise.  Cardiac:  Regular rate   Abdomen:  Non-distended  Musculoskeletal:  No peripheral edema   Skin:  Dry, no rashes  Neuro: Alert and orient x 4.  Moves all 4 extremity spontaneously.    Diagnostic Studies     Labs:  Please see EMR for labs obtained during this patient encounter.    Radiology:  Please see EMR for imaging obtained during this patient encounter.    ED Course and MDM     Aakash Chambers is a 55 y.o.  male with a history and presentation as described above in HPI.      Upon presentation, the patient was afebrile, well-appearing, with unremarkable vital signs.  Patient presented to the emergency department for lower lip swelling consistent with angioedema likely secondary to his lisinopril.  Differential diagnosis for swelling includes angioedema, hereditary angioedema, possible anaphylaxis, or possible localized trauma.  Patient did not but his lip, did not have any evidence of localized trauma.  Patient did not have any evidence that this was a anaphylactic reaction or allergic reaction given absence of any urticaria, pruritus, or new exposure being antigen.  Patient does take lisinopril, do feel that this was a's induced.  Patient already took Benadryl at home, he was given Protonix as well as Solu-Medrol here in the emergency department.  Edema was limited to the lower lip.  Patient did not have any signs of any deeper tissues involved.  He did not have any intraoral edema, did not have a uvula edema, did not have any sign suggestive of airway edema.  Patient was observed for 2 and half hours with improvement of his lower lip swelling.  He was given Solu-Medrol as well as Protonix.  He was not given epinephrine given that I do not feel that this was allergic mediated.  Given patient's improvement of his edema, he was appropriate for discharge.  Patient was issue stage I, making him appropriate for discharge with low likelihood of needing ICU or advanced airway.  I did discuss extensively return precautions with the patient including any worsening or new swelling, changes in his voice, difficulty monitoring his secretions, if he notices any swelling in his mouth, if he notices any neck swelling, difficulty swallowing or difficulty breathing.  Patient was instructed to stop his lisinopril and does have an appoint with his primary care doctor on Friday for which he will plan to discuss an alternative blood  pressure medication.    Impression     Diagnoses as of 07/30/25 1252   Angioedema, initial encounter        Plan       Discharge, see DCI provided      Bridger Pickett MD  [facility]           [1]   Past Medical History:  Diagnosis Date    Alcohol-induced acute pancreatitis without infection or necrosis ([facility]) 10/22/2016    Anxiety 2002    Arthritis     Diabetes mellitus (Multi)     Difficult intubation 07/17/2025    Easy mask with oral airway, Mac 4, grade 3, 1 attempt, intubation not attempted. Glidescope 4, grade 1, success intubation on first attempt.    Erectile dysfunction     GERD (gastroesophageal reflux disease)     Hypertension 07/05/2000    Yes    Joint pain     Pancreatitis ([facility]) 09/05/2023    Scoliosis    [2]   Past Surgical History:  Procedure Laterality Date    COLONOSCOPY      PENILE PROSTHESIS IMPLANT  07/17/2025    with Dr. Reese    TOTAL HIP ARTHROPLASTY Left 09/14/2016        Bridger Pickett MD  07/30/25 1252

## 2025-07-30 NOTE — DISCHARGE INSTRUCTIONS
You are seen in the emergency department for what we call angioedema.  I have attached information not only about what angioedema is but also discharge instructions regarding angioedema.  Please do not take your lisinopril anymore.  I do think that this was the cause of your swelling.  There is a chance your swelling may come back, you were observed for a period of time, and the swelling actually improved.  If you develop any new or worsening swelling of your lip, mouth, if you have difficulty breathing, changes in your voice, difficulty swallowing, noticing swelling inside your mouth, or if you have trouble controlling your saliva please return to the emergency department immediately as that is concerning signs of swelling of your airway and you need to see a medical professional right away.   otherwise, please follow-up with your primary physician on Friday, and discuss changing your blood pressure medication.

## 2025-07-30 NOTE — TELEPHONE ENCOUNTER
Patient stopped in reporting allergic reaction.lip swelling.. stated went to urgent care and decided not to wait  to be seen..contacted PCP nurse . Advised to go to ED..

## 2025-07-30 NOTE — Clinical Note
Aakash Chambers was seen and treated in our emergency department on 7/30/2025.  He may return to work on 08/01/2025.  Patient was seen in the emergency department on 7/30/2025 for swelling of his lip.  Please allow for 1 day offer patient to recover and continue to monitor his situation should he need to return to the emergency department.  He is okay to return to work on Friday, 8/1/2025.     If you have any questions or concerns, please don't hesitate to call.      Bridger Pickett MD

## 2025-07-31 ENCOUNTER — TELEMEDICINE (OUTPATIENT)
Dept: PRIMARY CARE | Facility: CLINIC | Age: 55
End: 2025-07-31
Payer: COMMERCIAL

## 2025-07-31 DIAGNOSIS — I10 ESSENTIAL HYPERTENSION: Primary | ICD-10-CM

## 2025-07-31 PROCEDURE — 4010F ACE/ARB THERAPY RXD/TAKEN: CPT | Performed by: INTERNAL MEDICINE

## 2025-07-31 PROCEDURE — 98012 SYNCH AUDIO-ONLY EST SF 10: CPT | Performed by: INTERNAL MEDICINE

## 2025-07-31 RX ORDER — AMLODIPINE BESYLATE 5 MG/1
5 TABLET ORAL DAILY
Qty: 30 TABLET | Refills: 5 | Status: SHIPPED | OUTPATIENT
Start: 2025-07-31 | End: 2026-01-27

## 2025-07-31 ASSESSMENT — ENCOUNTER SYMPTOMS
FEVER: 0
NAUSEA: 0
CHILLS: 0
VOMITING: 0
SHORTNESS OF BREATH: 0
ABDOMINAL PAIN: 0

## 2025-07-31 NOTE — PROGRESS NOTES
Subjective   Patient ID: Aakash Chambers is a 55 y.o. male who presents for virtual visit. Virtual or Telephone Consent     I performed this visit using a real-time audio only connection between Aakash Chambers & Halle Matute MD.  Verbal consent was requested and obtained from Aakash Chambers on this date, 07/31/25 for a telehealth visit and the patient's location was confirmed at the time of the visit.     Spoke with patient. Seen in ED yesterday for apparent allergic reaction to the Lisinopril. Lower lip was swollen. Now reports swelling is gone. No breathing difficulties.         Review of Systems   Constitutional:  Negative for chills and fever.   Respiratory:  Negative for shortness of breath.    Cardiovascular:  Negative for chest pain.   Gastrointestinal:  Negative for abdominal pain, nausea and vomiting.       Objective   There were no vitals taken for this visit.    Physical Exam  Virtual visit    Assessment/Plan   HTN: replaced Lisinopril with Amlodipine. RTO 2 weeks for follow up bp check.  Allergic reaction: resolved.  A total of 12 minutes were spent on the care and coordination of care of the patient.

## 2025-08-01 ENCOUNTER — APPOINTMENT (OUTPATIENT)
Dept: PRIMARY CARE | Facility: CLINIC | Age: 55
End: 2025-08-01
Payer: COMMERCIAL

## 2025-08-08 ENCOUNTER — APPOINTMENT (OUTPATIENT)
Dept: PRIMARY CARE | Facility: CLINIC | Age: 55
End: 2025-08-08
Payer: COMMERCIAL

## 2025-08-11 ENCOUNTER — OFFICE VISIT (OUTPATIENT)
Dept: UROLOGY | Facility: HOSPITAL | Age: 55
End: 2025-08-11
Payer: COMMERCIAL

## 2025-08-11 DIAGNOSIS — N52.8 OTHER MALE ERECTILE DYSFUNCTION: Primary | ICD-10-CM

## 2025-08-11 DIAGNOSIS — R39.9 LOWER URINARY TRACT SYMPTOMS (LUTS): ICD-10-CM

## 2025-08-11 PROCEDURE — 99213 OFFICE O/P EST LOW 20 MIN: CPT | Performed by: UROLOGY

## 2025-08-11 PROCEDURE — 99212 OFFICE O/P EST SF 10 MIN: CPT | Performed by: UROLOGY

## 2025-08-11 PROCEDURE — 4010F ACE/ARB THERAPY RXD/TAKEN: CPT | Performed by: UROLOGY

## 2025-08-11 RX ORDER — TADALAFIL 5 MG/1
5 TABLET ORAL DAILY
Qty: 30 TABLET | Refills: 11 | Status: SHIPPED | OUTPATIENT
Start: 2025-08-11

## 2025-08-13 ENCOUNTER — OFFICE VISIT (OUTPATIENT)
Dept: PRIMARY CARE | Facility: CLINIC | Age: 55
End: 2025-08-13
Payer: COMMERCIAL

## 2025-08-13 ENCOUNTER — APPOINTMENT (OUTPATIENT)
Dept: PRIMARY CARE | Facility: CLINIC | Age: 55
End: 2025-08-13
Payer: COMMERCIAL

## 2025-08-13 VITALS
HEART RATE: 101 BPM | TEMPERATURE: 98.1 F | BODY MASS INDEX: 21.11 KG/M2 | OXYGEN SATURATION: 98 % | HEIGHT: 70 IN | SYSTOLIC BLOOD PRESSURE: 130 MMHG | DIASTOLIC BLOOD PRESSURE: 79 MMHG | WEIGHT: 147.5 LBS

## 2025-08-13 DIAGNOSIS — R63.4 WEIGHT LOSS: Primary | ICD-10-CM

## 2025-08-13 DIAGNOSIS — F10.90 ALCOHOL USE: ICD-10-CM

## 2025-08-13 PROCEDURE — 3008F BODY MASS INDEX DOCD: CPT | Performed by: INTERNAL MEDICINE

## 2025-08-13 PROCEDURE — 3074F SYST BP LT 130 MM HG: CPT | Performed by: INTERNAL MEDICINE

## 2025-08-13 PROCEDURE — 3078F DIAST BP <80 MM HG: CPT | Performed by: INTERNAL MEDICINE

## 2025-08-13 PROCEDURE — 4010F ACE/ARB THERAPY RXD/TAKEN: CPT | Performed by: INTERNAL MEDICINE

## 2025-08-13 PROCEDURE — 99212 OFFICE O/P EST SF 10 MIN: CPT | Performed by: INTERNAL MEDICINE

## 2025-08-13 PROCEDURE — 99214 OFFICE O/P EST MOD 30 MIN: CPT | Performed by: INTERNAL MEDICINE

## 2025-08-13 PROCEDURE — 1036F TOBACCO NON-USER: CPT | Performed by: INTERNAL MEDICINE

## 2025-08-13 SDOH — ECONOMIC STABILITY: FOOD INSECURITY: WITHIN THE PAST 12 MONTHS, THE FOOD YOU BOUGHT JUST DIDN'T LAST AND YOU DIDN'T HAVE MONEY TO GET MORE.: NEVER TRUE

## 2025-08-13 SDOH — ECONOMIC STABILITY: FOOD INSECURITY: WITHIN THE PAST 12 MONTHS, YOU WORRIED THAT YOUR FOOD WOULD RUN OUT BEFORE YOU GOT MONEY TO BUY MORE.: NEVER TRUE

## 2025-08-13 ASSESSMENT — ENCOUNTER SYMPTOMS
ABDOMINAL PAIN: 0
CHILLS: 0
VOMITING: 0
OCCASIONAL FEELINGS OF UNSTEADINESS: 0
FEVER: 0
SHORTNESS OF BREATH: 0
NAUSEA: 0
DEPRESSION: 0
LOSS OF SENSATION IN FEET: 0

## 2025-08-13 ASSESSMENT — PATIENT HEALTH QUESTIONNAIRE - PHQ9
1. LITTLE INTEREST OR PLEASURE IN DOING THINGS: NOT AT ALL
2. FEELING DOWN, DEPRESSED OR HOPELESS: NOT AT ALL
SUM OF ALL RESPONSES TO PHQ9 QUESTIONS 1 & 2: 0

## 2025-08-13 ASSESSMENT — PAIN SCALES - GENERAL: PAINLEVEL_OUTOF10: 0-NO PAIN

## 2025-08-13 ASSESSMENT — LIFESTYLE VARIABLES: HOW OFTEN DO YOU HAVE SIX OR MORE DRINKS ON ONE OCCASION: WEEKLY

## 2025-08-27 DIAGNOSIS — F41.9 ANXIETY DISORDER, UNSPECIFIED TYPE: ICD-10-CM

## 2025-08-27 RX ORDER — ALPRAZOLAM 0.5 MG/1
0.5 TABLET ORAL 2 TIMES DAILY
Qty: 60 TABLET | Refills: 2 | Status: SHIPPED | OUTPATIENT
Start: 2025-08-27

## 2025-08-29 ENCOUNTER — APPOINTMENT (OUTPATIENT)
Dept: UROLOGY | Facility: CLINIC | Age: 55
End: 2025-08-29
Payer: COMMERCIAL

## 2025-09-02 ENCOUNTER — APPOINTMENT (OUTPATIENT)
Dept: UROLOGY | Facility: CLINIC | Age: 55
End: 2025-09-02
Payer: COMMERCIAL

## 2025-09-30 ENCOUNTER — APPOINTMENT (OUTPATIENT)
Dept: UROLOGY | Facility: CLINIC | Age: 55
End: 2025-09-30
Payer: COMMERCIAL

## (undated) DEVICE — ADHESIVE, SKIN, DERMABOND ADVANCED, 15CM, PEN-STYLE

## (undated) DEVICE — DRAIN, WOUND, ROUND, W/TROCAR, HOLE PATTERN, 10 IN, MEDIUM/LARGE, 3/16 X 49 IN

## (undated) DEVICE — SYRINGE, 50 CC, LUER LOCK

## (undated) DEVICE — TIP, SUCTION, YANKAUER, BULB, ADULT

## (undated) DEVICE — HEMOSTAT, SURGICEL POWDER 3.0GRAMS

## (undated) DEVICE — SYRINGE, 10 CC, LUER LOCK

## (undated) DEVICE — EVACUATOR, WOUND, SUCTION, CLOSED, JACKSON-PRATT, 100 CC, SILICONE

## (undated) DEVICE — TOWEL, SURGICAL, NEURO, O/R, 16 X 26, BLUE, STERILE

## (undated) DEVICE — SYSTEM, PENILE IMPLANT, W/6 BLUNT TIP, 12MM STAY HOOKS

## (undated) DEVICE — SUTURE, VICRYL PLUS, 2-0, 27IN, UR-6, VIOLET, BRAIDED

## (undated) DEVICE — GLOVE, SURGICAL, PROTEXIS PI MICRO, 6.0, PF, LF

## (undated) DEVICE — NEEDLE, HYPODERMIC, MONOJECT, 18 G X 1.5 IN

## (undated) DEVICE — BAG, DECANTER

## (undated) DEVICE — DRAPE, SHEET, U, STERI DRAPE, 47 X 51 IN, DISPOSABLE, STERILE

## (undated) DEVICE — COVER, TABLE, 44 X 75 IN, DISPOSABLE, LF, STERILE

## (undated) DEVICE — DRESSING, ADHESIVE, ISLAND, TELFA, 2 X 3.75 IN, LF

## (undated) DEVICE — SYRINGE, 20 CC, LUER LOCK, MONOJECT, W/O CAP, LF

## (undated) DEVICE — TUBING, SUCTION, NON-CONDUCTIVE, W/CONNECT,.25 IN X 12 FT, STERILE, LF

## (undated) DEVICE — WOUND SYSTEM, DEBRIDEMENT & CLEANING, O.R DUOPAK

## (undated) DEVICE — SPONGE, LAP, XRAY DECT, 18IN X 18IN, W/MASTER DMT, STERILE

## (undated) DEVICE — GLOVE, SURGICAL, PROTEXIS PI MICRO, 7.0, PF, LF

## (undated) DEVICE — SUTURE, ETHILON, 2-0, 18 IN, FS, BLACK, BX/12

## (undated) DEVICE — TRAY, FOLEY, LUBRI-SIL, 16FR, COMPLETE CARE W/STATLOCK

## (undated) DEVICE — SUPPORTER, ATHLETIC, ADULT, X-LARGE

## (undated) DEVICE — SUTURE, MONOCRYL, 4-0, 18 IN, PS2, UNDYED

## (undated) DEVICE — DRESSING, GAUZE, FLUFF, 1 PLY, 18 X 36 IN

## (undated) DEVICE — KIT, MINOR, DOUBLE BASIN

## (undated) DEVICE — NEEDLE, HYPODERMIC, 23 GA X 1.5 IN